# Patient Record
Sex: FEMALE | Race: WHITE | Employment: OTHER | URBAN - METROPOLITAN AREA
[De-identification: names, ages, dates, MRNs, and addresses within clinical notes are randomized per-mention and may not be internally consistent; named-entity substitution may affect disease eponyms.]

---

## 2018-05-01 ENCOUNTER — HOSPITAL ENCOUNTER (OUTPATIENT)
Dept: GENERAL RADIOLOGY | Age: 73
Discharge: HOME | End: 2018-05-03
Payer: COMMERCIAL

## 2018-05-01 DIAGNOSIS — Z12.39 SCREENING FOR MALIGNANT NEOPLASM OF BREAST: ICD-10-CM

## 2018-05-01 PROCEDURE — 77063 BREAST TOMOSYNTHESIS BI: CPT

## 2018-05-21 ENCOUNTER — HOSPITAL ENCOUNTER (OUTPATIENT)
Dept: SPEECH THERAPY | Age: 73
Setting detail: THERAPIES SERIES
Discharge: HOME | End: 2018-05-21
Payer: COMMERCIAL

## 2018-05-29 ENCOUNTER — HOSPITAL ENCOUNTER (OUTPATIENT)
Dept: SPEECH THERAPY | Age: 73
Setting detail: THERAPIES SERIES
Discharge: HOME | End: 2018-05-29
Payer: COMMERCIAL

## 2018-05-29 PROCEDURE — G9168 MEMORY CURRENT STATUS: HCPCS

## 2018-05-29 PROCEDURE — 96111 HC DEVELOP TESTING EXTD OT: CPT

## 2018-06-11 ENCOUNTER — OFFICE VISIT (OUTPATIENT)
Dept: ENT CLINIC | Age: 73
End: 2018-06-11
Payer: COMMERCIAL

## 2018-06-11 VITALS
HEART RATE: 72 BPM | HEIGHT: 64 IN | WEIGHT: 155 LBS | DIASTOLIC BLOOD PRESSURE: 67 MMHG | OXYGEN SATURATION: 93 % | SYSTOLIC BLOOD PRESSURE: 117 MMHG | BODY MASS INDEX: 26.46 KG/M2

## 2018-06-11 DIAGNOSIS — R42 VERTIGO: Primary | ICD-10-CM

## 2018-06-11 DIAGNOSIS — H81.11 BPPV (BENIGN PAROXYSMAL POSITIONAL VERTIGO), RIGHT: ICD-10-CM

## 2018-06-11 PROCEDURE — 99213 OFFICE O/P EST LOW 20 MIN: CPT | Performed by: OTOLARYNGOLOGY

## 2018-06-11 ASSESSMENT — ENCOUNTER SYMPTOMS
NAUSEA: 0
GASTROINTESTINAL NEGATIVE: 1
EYES NEGATIVE: 1
VOMITING: 0

## 2018-07-13 ENCOUNTER — HOSPITAL ENCOUNTER (EMERGENCY)
Age: 73
Discharge: HOME | End: 2018-07-13
Payer: COMMERCIAL

## 2018-07-13 ENCOUNTER — APPOINTMENT (OUTPATIENT)
Dept: CT IMAGING | Age: 73
End: 2018-07-13
Payer: COMMERCIAL

## 2018-07-13 VITALS
HEIGHT: 64 IN | WEIGHT: 154 LBS | DIASTOLIC BLOOD PRESSURE: 56 MMHG | TEMPERATURE: 98.5 F | OXYGEN SATURATION: 95 % | SYSTOLIC BLOOD PRESSURE: 114 MMHG | HEART RATE: 84 BPM | RESPIRATION RATE: 14 BRPM | BODY MASS INDEX: 26.29 KG/M2

## 2018-07-13 DIAGNOSIS — K57.32 DIVERTICULITIS OF COLON: ICD-10-CM

## 2018-07-13 DIAGNOSIS — R11.0 NAUSEA: ICD-10-CM

## 2018-07-13 DIAGNOSIS — R10.30 LOWER ABDOMINAL PAIN: Primary | ICD-10-CM

## 2018-07-13 LAB
ALBUMIN SERPL-MCNC: 4.5 G/DL (ref 3.5–5.2)
ALP BLD-CCNC: 91 U/L (ref 35–104)
ALT SERPL-CCNC: 30 U/L (ref 0–32)
ANION GAP SERPL CALCULATED.3IONS-SCNC: 14 MMOL/L (ref 7–16)
AST SERPL-CCNC: 23 U/L (ref 0–31)
BACTERIA: ABNORMAL /HPF
BASOPHILS ABSOLUTE: 0.02 E9/L (ref 0–0.2)
BASOPHILS RELATIVE PERCENT: 0.2 % (ref 0–2)
BILIRUB SERPL-MCNC: 0.7 MG/DL (ref 0–1.2)
BILIRUBIN URINE: NEGATIVE
BLOOD, URINE: ABNORMAL
BUN BLDV-MCNC: 15 MG/DL (ref 8–23)
CALCIUM SERPL-MCNC: 9.7 MG/DL (ref 8.6–10.2)
CHLORIDE BLD-SCNC: 100 MMOL/L (ref 98–107)
CLARITY: CLEAR
CO2: 25 MMOL/L (ref 22–29)
COLOR: YELLOW
CREAT SERPL-MCNC: 0.8 MG/DL (ref 0.5–1)
EOSINOPHILS ABSOLUTE: 0.12 E9/L (ref 0.05–0.5)
EOSINOPHILS RELATIVE PERCENT: 1.4 % (ref 0–6)
EPITHELIAL CELLS, UA: ABNORMAL /HPF
GFR AFRICAN AMERICAN: >60
GFR NON-AFRICAN AMERICAN: >60 ML/MIN/1.73
GLUCOSE BLD-MCNC: 89 MG/DL (ref 74–109)
GLUCOSE URINE: NEGATIVE MG/DL
HCT VFR BLD CALC: 37.8 % (ref 34–48)
HEMOGLOBIN: 13 G/DL (ref 11.5–15.5)
IMMATURE GRANULOCYTES #: 0.03 E9/L
IMMATURE GRANULOCYTES %: 0.3 % (ref 0–5)
KETONES, URINE: NEGATIVE MG/DL
LEUKOCYTE ESTERASE, URINE: NEGATIVE
LIPASE: 28 U/L (ref 13–60)
LYMPHOCYTES ABSOLUTE: 2.02 E9/L (ref 1.5–4)
LYMPHOCYTES RELATIVE PERCENT: 23.4 % (ref 20–42)
MCH RBC QN AUTO: 31.3 PG (ref 26–35)
MCHC RBC AUTO-ENTMCNC: 34.4 % (ref 32–34.5)
MCV RBC AUTO: 91.1 FL (ref 80–99.9)
MONOCYTES ABSOLUTE: 1.09 E9/L (ref 0.1–0.95)
MONOCYTES RELATIVE PERCENT: 12.6 % (ref 2–12)
NEUTROPHILS ABSOLUTE: 5.35 E9/L (ref 1.8–7.3)
NEUTROPHILS RELATIVE PERCENT: 62.1 % (ref 43–80)
NITRITE, URINE: NEGATIVE
PDW BLD-RTO: 13.1 FL (ref 11.5–15)
PH UA: 5.5 (ref 5–9)
PLATELET # BLD: 202 E9/L (ref 130–450)
PMV BLD AUTO: 11 FL (ref 7–12)
POTASSIUM SERPL-SCNC: 4.2 MMOL/L (ref 3.5–5)
PROTEIN UA: NEGATIVE MG/DL
RBC # BLD: 4.15 E12/L (ref 3.5–5.5)
RBC UA: ABNORMAL /HPF (ref 0–2)
RENAL EPITHELIAL, UA: ABNORMAL /HPF
SODIUM BLD-SCNC: 139 MMOL/L (ref 132–146)
SPECIFIC GRAVITY UA: <=1.005 (ref 1–1.03)
TOTAL PROTEIN: 7.6 G/DL (ref 6.4–8.3)
UROBILINOGEN, URINE: 0.2 E.U./DL
WBC # BLD: 8.6 E9/L (ref 4.5–11.5)
WBC UA: ABNORMAL /HPF (ref 0–5)

## 2018-07-13 PROCEDURE — 99284 EMERGENCY DEPT VISIT MOD MDM: CPT

## 2018-07-13 PROCEDURE — 74177 CT ABD & PELVIS W/CONTRAST: CPT

## 2018-07-13 PROCEDURE — 80053 COMPREHEN METABOLIC PANEL: CPT

## 2018-07-13 PROCEDURE — 2580000003 HC RX 258: Performed by: NURSE PRACTITIONER

## 2018-07-13 PROCEDURE — 6360000004 HC RX CONTRAST MEDICATION: Performed by: RADIOLOGY

## 2018-07-13 PROCEDURE — 36415 COLL VENOUS BLD VENIPUNCTURE: CPT

## 2018-07-13 PROCEDURE — 83690 ASSAY OF LIPASE: CPT

## 2018-07-13 PROCEDURE — 96375 TX/PRO/DX INJ NEW DRUG ADDON: CPT

## 2018-07-13 PROCEDURE — 96374 THER/PROPH/DIAG INJ IV PUSH: CPT

## 2018-07-13 PROCEDURE — 85025 COMPLETE CBC W/AUTO DIFF WBC: CPT

## 2018-07-13 PROCEDURE — 81001 URINALYSIS AUTO W/SCOPE: CPT

## 2018-07-13 PROCEDURE — 6360000002 HC RX W HCPCS: Performed by: NURSE PRACTITIONER

## 2018-07-13 RX ORDER — MORPHINE SULFATE 4 MG/ML
4 INJECTION, SOLUTION INTRAMUSCULAR; INTRAVENOUS ONCE
Status: COMPLETED | OUTPATIENT
Start: 2018-07-13 | End: 2018-07-13

## 2018-07-13 RX ORDER — CEFDINIR 300 MG/1
300 CAPSULE ORAL 2 TIMES DAILY
Qty: 20 CAPSULE | Refills: 0 | Status: SHIPPED | OUTPATIENT
Start: 2018-07-13 | End: 2018-07-23

## 2018-07-13 RX ORDER — ONDANSETRON 4 MG/1
4 TABLET, FILM COATED ORAL EVERY 8 HOURS PRN
Qty: 12 TABLET | Refills: 0 | Status: SHIPPED | OUTPATIENT
Start: 2018-07-13 | End: 2018-07-18

## 2018-07-13 RX ORDER — 0.9 % SODIUM CHLORIDE 0.9 %
1000 INTRAVENOUS SOLUTION INTRAVENOUS ONCE
Status: COMPLETED | OUTPATIENT
Start: 2018-07-13 | End: 2018-07-13

## 2018-07-13 RX ORDER — DICYCLOMINE HYDROCHLORIDE 10 MG/1
20 CAPSULE ORAL
Qty: 40 CAPSULE | Refills: 0 | Status: SHIPPED | OUTPATIENT
Start: 2018-07-13 | End: 2018-08-07 | Stop reason: SDUPTHER

## 2018-07-13 RX ORDER — METRONIDAZOLE 500 MG/1
500 TABLET ORAL 4 TIMES DAILY
Qty: 40 TABLET | Refills: 0 | Status: SHIPPED | OUTPATIENT
Start: 2018-07-13 | End: 2018-07-23

## 2018-07-13 RX ORDER — ONDANSETRON 2 MG/ML
4 INJECTION INTRAMUSCULAR; INTRAVENOUS ONCE
Status: COMPLETED | OUTPATIENT
Start: 2018-07-13 | End: 2018-07-13

## 2018-07-13 RX ADMIN — MORPHINE SULFATE 4 MG: 4 INJECTION INTRAVENOUS at 16:20

## 2018-07-13 RX ADMIN — IOPAMIDOL 100 ML: 755 INJECTION, SOLUTION INTRAVENOUS at 17:13

## 2018-07-13 RX ADMIN — SODIUM CHLORIDE 1000 ML: 9 INJECTION, SOLUTION INTRAVENOUS at 16:20

## 2018-07-13 RX ADMIN — ONDANSETRON 4 MG: 2 INJECTION INTRAMUSCULAR; INTRAVENOUS at 16:20

## 2018-07-13 ASSESSMENT — PAIN DESCRIPTION - LOCATION
LOCATION: ABDOMEN
LOCATION: ABDOMEN

## 2018-07-13 ASSESSMENT — PAIN DESCRIPTION - FREQUENCY: FREQUENCY: CONTINUOUS

## 2018-07-13 ASSESSMENT — PAIN DESCRIPTION - PROGRESSION: CLINICAL_PROGRESSION: NOT CHANGED

## 2018-07-13 ASSESSMENT — PAIN SCALES - GENERAL
PAINLEVEL_OUTOF10: 6

## 2018-07-13 ASSESSMENT — PAIN DESCRIPTION - DESCRIPTORS: DESCRIPTORS: SHARP

## 2018-07-13 ASSESSMENT — PAIN DESCRIPTION - PAIN TYPE
TYPE: ACUTE PAIN
TYPE: ACUTE PAIN

## 2018-07-13 NOTE — ED PROVIDER NOTES
[facility]     Department of Emergency Medicine   ED  Provider Note  Admit Date/Time: 7/13/2018  3:30 PM  ED Bed: 03/03  Chief Complaint:       Abdominal Pain (low abd pain x 3-4 days  + nausea)    History of Present Illness   Source of history provided by:  patient. History/Exam Limitations: none. Noah Gonzales is a 71 y.o. old female who has a past medical history of:   Past Medical History:   Diagnosis Date    Chronic headaches     Depression     Fatigue     Fibromyalgia     GERD (gastroesophageal reflux disease)     Hyperlipidemia     Nausea & vomiting     Von Willebrand disease ([address]     presents to the emergency department by private vehicle, for complaints of gradual onset, still present aching, pressure, sharp, shooting, stabbing pain in the lower abdomen without radiation which began 3 day(s) prior to arrival.   There has been no similar episodes in the past.  Since onset the symptoms have been persistent. The pain is associated with abdominal pain, vomiting and nausea. The pain is aggravated by none and relieved by none and nothing. There has been NO none. .  ROS   Pertinent positives and negatives are stated within HPI, all other systems reviewed and are negative. Past Surgical History:   Procedure Laterality Date    APPENDECTOMY      BACK SURGERY  2002    lumbar    CHOLECYSTECTOMY      COSMETIC SURGERY  2007    abdominoplasty    COSMETIC SURGERY  2009    facial lift    CYSTOSCOPY  3/8/16    HYSTERECTOMY      TONSILLECTOMY     Social History:  reports that she has never smoked. She has never used smokeless tobacco. She reports that she drinks alcohol. She reports that she does not use drugs. Family History: family history includes COPD in her mother; Cancer in her brother; Diabetes in her mother; High Blood Pressure in her father; Liver Disease in her mother; Lung Cancer in her brother and father. Allergies: Patient has no known allergies.     Physical Exam 4 times daily (before meals and nightly), Disp-40 capsule, R-nonePrint       !! - Potential duplicate medications found. Please discuss with provider. Electronically signed by WEST Muniz CNP   DD: 7/13/18  **This report was transcribed using voice recognition software. Every effort was made to ensure accuracy; however, inadvertent computerized transcription errors may be present.   END OF PROVIDER NOTE      WEST Muniz CNP  07/13/18 [id number]

## 2018-08-03 ENCOUNTER — TELEPHONE (OUTPATIENT)
Dept: ENT CLINIC | Age: 73
End: 2018-08-03

## 2018-08-07 PROBLEM — F32.0 CURRENT MILD EPISODE OF MAJOR DEPRESSIVE DISORDER WITHOUT PRIOR EPISODE (HCC): Status: ACTIVE | Noted: 2018-08-07

## 2018-08-07 PROBLEM — K58.2 IRRITABLE BOWEL SYNDROME WITH BOTH CONSTIPATION AND DIARRHEA: Status: ACTIVE | Noted: 2018-08-07

## 2018-09-12 ENCOUNTER — HOSPITAL ENCOUNTER (OUTPATIENT)
Dept: GENERAL RADIOLOGY | Age: 73
Discharge: HOME | End: 2018-09-14
Payer: COMMERCIAL

## 2018-09-12 ENCOUNTER — HOSPITAL ENCOUNTER (OUTPATIENT)
Dept: CT IMAGING | Age: 73
Discharge: HOME | End: 2018-09-14
Payer: COMMERCIAL

## 2018-09-12 DIAGNOSIS — T17.908A ASPIRATION INTO AIRWAY, INITIAL ENCOUNTER: ICD-10-CM

## 2018-09-12 PROCEDURE — G8998 SWALLOW D/C STATUS: HCPCS

## 2018-09-12 PROCEDURE — G8996 SWALLOW CURRENT STATUS: HCPCS

## 2018-09-12 PROCEDURE — 74230 X-RAY XM SWLNG FUNCJ C+: CPT

## 2018-09-12 PROCEDURE — G8997 SWALLOW GOAL STATUS: HCPCS

## 2018-09-12 PROCEDURE — 71250 CT THORAX DX C-: CPT

## 2018-09-12 PROCEDURE — 92611 MOTION FLUOROSCOPY/SWALLOW: CPT

## 2018-09-12 NOTE — PROGRESS NOTES
SPEECH/LANGUAGE PATHOLOGY  VIDEOFLUOROSCOPIC STUDY OF SWALLOWING ([facility] [place])      PATIENT NAME:  Clara Roper      :  1945      TODAY'S DATE:  2018    SUMMARY OF EVALUATION     DYSPHAGIA DIAGNOSIS:  Within Functional Limits     DIET RECOMMENDATIONS: Regular consistency solids with regular consistency liquids     COMPENSATORY STRATEGIES:      []Double swallow with []all consistencies []thin []nectar []honey []pureed []ground []chopped []soft solid []solid       []Multiple swallow (  Times) with []all consistencies []thin []nectar []honey []pureed []ground []chopped []soft solid []solid     []Chin tuck with []all consistencies  []thin []nectar []honey []pureed []ground []chopped []soft solid []solid      []Throat clear after with []all consistencies []thin []nectar []honey []pureed []ground []chopped []soft solid []solid      []Effortful swallow with []all consistencies  []thin []nectar []honey []pureed []ground []chopped []soft solid []solid     []Small bites/sips        []Alternate solids / liquids      []Check for oral pocketing     []No straw            []Spoon sip liquids        []       ASSISTANCE LEVEL:  [x]No assistance required   []Stand by assist   []Full assistance required  []Set up required   []Supervision with all PO intake    [] Malnutrition indicators have been identified and nursing has been notified to ensure a dietary consult is ordered.      THERAPY RECOMMENDATIONS:       [x]  Therapy is not recommended       []  Therapy is recommended to:     []  Improve oral motor strength and range of motion     []  Improve tongue base retraction      []  Improve laryngeal strength and range of motion     []  Address cricopharyngeal dysfunction (Shaker Exercises)    []  Mealtime assessment of patient's tolerance of prescribed diet     []  Repeat Video Swallowing Evaluation is recommended and requires a Physician order    []Therapy at the discretion of facility/treating Speech Pathologist ([]inconsistently  []consistently []only with use of a straw). [] Laryngeal penetration was noted AFTER the swallow for ([]thin []nectar []honey[] pureed []solid)          due to: []residuals in laryngeal vestibule       []pharyngeal residual       []redirection of bolus from the esophagus        which  []cleared from the laryngeal vestibule spontaneously  (transient)     []cleared from the laryngeal vestibule with a cued, re-directive throat clear       []remained in the laryngeal vestibule. []penetrated deep into the laryngeal vestibule (to the level of the true vocal folds)      Laryngeal penetration was  ([]trace []mild []moderate []marked []severe ) and      occurred ([]inconsistently  []consistently []only with use of a straw).         In response to laryngeal penetration,  []A  spontaneous cough/throat clear [] an inconsistent  [] a delayed cough       []an absent cough/throat clear was noted     ASPIRATION    [x]Aspiration was not present during this evaluation      []Aspiration BEFORE the swallow was present for ([]thin[] nectar[] honey []pureed []solid)       due to: ([] decreased bolus formation []premature pharyngeal entry []delayed pharyngeal swallow)       []Aspiration DURING the swallow was present for  ([]thin []nectar []honey[] pureed []solid)      due to: ([]delayed laryngeal closure []inadequate laryngeal closure)       []Aspiration AFTER  the swallow was present for ([]thin[] nectar []honey []pureed []solid)      due to:  ([]residuals in laryngeal vestibule []pharyngeal residual []redirection of bolus from the esophagus)      In response to aspiration,  []A  spontaneous cough/throat clear [] an inconsistent/delayed cough      []an absent cough/throat clear was noted     COMPENSATORY STRATEGIES    [] Compensatory strategies that were beneficial included: []chin tuck []double swallow []multiple swallow []alternating solids/liquids          []cued redirective cough []cued throat clear [] Compensatory strategies that were not beneficial included: []chin tuck []double swallow []multiple swallow []alternating solids/liquids          []cued redirective cough []cued throat clear       [x] Compensatory strategies were not attempted. STRUCTURAL/FUNCTIONAL ANOMALIES    [x]No structural/functional anomalies were noted      []Inadequate velopharyngeal closure resulting in nasopharyngeal reflux. [] There was presence of Zenkers Diverticulum per Radiologist        []Comments:       CERVICAL ESOPHAGEAL STAGE : [x]Adequate []Inadequate  []Not Assessed     []Cervical osteophytes present per Radiologist   []Structural/mechanical abnormality in cervical esophagus per Radiologist  [] Redirection of bolus from the esophagus into pharynx                                []  Prognosis for improvements is   []  This plan will be re-evaluated and revised in 1 week  if warranted. []  Patient stated goals:   []  Treatment goals discussed with [] patient/  [] family. []  The []  patient/ []  family understand the diagnosis, prognosis and plan of care. [x]The admitting diagnosis and active problem list, as listed below have been reviewed prior to initiation of this evaluation.      ADMITTING DIAGNOSIS: Aspiration into airway, initial encounter [T17.511P]     ACTIVE PROBLEM LIST:   Patient Active Problem List   Diagnosis    Allergic reaction    Syncope    Pain    Other and unspecified hyperlipidemia    Anxiety    Depression    Osteoarthritis    Pure hypercholesterolemia    Fibromyalgia    Lower abdominal pain    Diverticulitis of colon    Nausea    Irritable bowel syndrome with both constipation and diarrhea    Current mild episode of major depressive disorder without prior episode ([facility] [address]

## 2018-10-03 ENCOUNTER — HOSPITAL ENCOUNTER (OUTPATIENT)
Age: 73
Discharge: HOME | End: 2018-10-05

## 2018-10-03 PROCEDURE — 88342 IMHCHEM/IMCYTCHM 1ST ANTB: CPT

## 2018-10-03 PROCEDURE — 88305 TISSUE EXAM BY PATHOLOGIST: CPT

## 2019-08-23 ENCOUNTER — TELEPHONE (OUTPATIENT)
Dept: CARDIOLOGY | Age: 74
End: 2019-08-23

## 2019-08-27 ENCOUNTER — HOSPITAL ENCOUNTER (OUTPATIENT)
Dept: CARDIOLOGY | Age: 74
Discharge: HOME | End: 2019-08-27
Payer: COMMERCIAL

## 2019-08-27 VITALS
HEART RATE: 75 BPM | WEIGHT: 150 LBS | SYSTOLIC BLOOD PRESSURE: 138 MMHG | HEIGHT: 63 IN | BODY MASS INDEX: 26.58 KG/M2 | OXYGEN SATURATION: 96 % | DIASTOLIC BLOOD PRESSURE: 80 MMHG

## 2019-08-27 DIAGNOSIS — R06.02 SOB (SHORTNESS OF BREATH): ICD-10-CM

## 2019-08-27 DIAGNOSIS — R07.9 CHEST PAIN, UNSPECIFIED TYPE: ICD-10-CM

## 2019-08-27 LAB
LV EF: 71 %
LVEF MODALITY: NORMAL

## 2019-08-27 PROCEDURE — 3430000000 HC RX DIAGNOSTIC RADIOPHARMACEUTICAL: Performed by: INTERNAL MEDICINE

## 2019-08-27 PROCEDURE — 93017 CV STRESS TEST TRACING ONLY: CPT

## 2019-08-27 PROCEDURE — 2580000003 HC RX 258: Performed by: INTERNAL MEDICINE

## 2019-08-27 PROCEDURE — A9502 TC99M TETROFOSMIN: HCPCS | Performed by: INTERNAL MEDICINE

## 2019-08-27 PROCEDURE — 78452 HT MUSCLE IMAGE SPECT MULT: CPT

## 2019-08-27 RX ORDER — LORAZEPAM 0.5 MG/1
TABLET ORAL
Refills: 0 | COMMUNITY
Start: 2019-08-22 | End: 2020-01-14 | Stop reason: SDUPTHER

## 2019-08-27 RX ORDER — SODIUM CHLORIDE 0.9 % (FLUSH) 0.9 %
10 SYRINGE (ML) INJECTION PRN
Status: DISCONTINUED | OUTPATIENT
Start: 2019-08-27 | End: 2019-08-28 | Stop reason: HOSPADM

## 2019-08-27 RX ADMIN — Medication 10 ML: at 08:37

## 2019-08-27 RX ADMIN — Medication 10 ML: at 09:37

## 2019-08-27 RX ADMIN — TETROFOSMIN 9.2 MILLICURIE: 0.23 INJECTION, POWDER, LYOPHILIZED, FOR SOLUTION INTRAVENOUS at 08:37

## 2019-08-27 RX ADMIN — TETROFOSMIN 25.2 MILLICURIE: 0.23 INJECTION, POWDER, LYOPHILIZED, FOR SOLUTION INTRAVENOUS at 09:37

## 2019-10-30 ENCOUNTER — HOSPITAL ENCOUNTER (OUTPATIENT)
Dept: CT IMAGING | Age: 74
Discharge: HOME | End: 2019-11-01
Payer: COMMERCIAL

## 2019-10-30 PROCEDURE — 71250 CT THORAX DX C-: CPT

## 2020-02-05 ENCOUNTER — HOSPITAL ENCOUNTER (OUTPATIENT)
Age: 75
Discharge: HOME | End: 2020-02-07
Payer: MEDICARE

## 2020-02-05 ENCOUNTER — HOSPITAL ENCOUNTER (OUTPATIENT)
Dept: GENERAL RADIOLOGY | Age: 75
Discharge: HOME | End: 2020-02-07
Payer: MEDICARE

## 2020-02-05 PROCEDURE — 72050 X-RAY EXAM NECK SPINE 4/5VWS: CPT

## 2021-02-12 ENCOUNTER — HOSPITAL ENCOUNTER (OUTPATIENT)
Age: 76
Discharge: HOME | End: 2021-02-14

## 2021-02-12 LAB
ABO/RH: NORMAL
ANTIBODY SCREEN: NORMAL

## 2021-02-12 PROCEDURE — 87081 CULTURE SCREEN ONLY: CPT

## 2021-02-12 PROCEDURE — 86850 RBC ANTIBODY SCREEN: CPT

## 2021-02-12 PROCEDURE — 86901 BLOOD TYPING SEROLOGIC RH(D): CPT

## 2021-02-12 PROCEDURE — 86900 BLOOD TYPING SEROLOGIC ABO: CPT

## 2021-02-14 LAB — MRSA CULTURE ONLY: NORMAL

## 2021-02-19 ENCOUNTER — HOSPITAL ENCOUNTER (OUTPATIENT)
Age: 76
Discharge: HOME | End: 2021-02-21

## 2021-02-19 LAB
ANION GAP SERPL CALCULATED.3IONS-SCNC: 9 MMOL/L (ref 7–16)
BUN BLDV-MCNC: 11 MG/DL (ref 8–23)
CALCIUM SERPL-MCNC: 8.5 MG/DL (ref 8.6–10.2)
CHLORIDE BLD-SCNC: 98 MMOL/L (ref 98–107)
CO2: 26 MMOL/L (ref 22–29)
CREAT SERPL-MCNC: 0.6 MG/DL (ref 0.5–1)
GFR AFRICAN AMERICAN: >60
GFR NON-AFRICAN AMERICAN: >60 ML/MIN/1.73
GLUCOSE BLD-MCNC: 139 MG/DL (ref 74–99)
HCT VFR BLD CALC: 34.2 % (ref 34–48)
HEMOGLOBIN: 11.2 G/DL (ref 11.5–15.5)
MCH RBC QN AUTO: 31.5 PG (ref 26–35)
MCHC RBC AUTO-ENTMCNC: 32.7 % (ref 32–34.5)
MCV RBC AUTO: 96.1 FL (ref 80–99.9)
PDW BLD-RTO: 13.8 FL (ref 11.5–15)
PLATELET # BLD: 175 E9/L (ref 130–450)
PMV BLD AUTO: 11.7 FL (ref 7–12)
POTASSIUM SERPL-SCNC: 4 MMOL/L (ref 3.5–5)
RBC # BLD: 3.56 E12/L (ref 3.5–5.5)
SODIUM BLD-SCNC: 133 MMOL/L (ref 132–146)
WBC # BLD: 8.8 E9/L (ref 4.5–11.5)

## 2021-02-19 PROCEDURE — 80048 BASIC METABOLIC PNL TOTAL CA: CPT

## 2021-02-19 PROCEDURE — 85027 COMPLETE CBC AUTOMATED: CPT

## 2021-05-07 ENCOUNTER — APPOINTMENT (OUTPATIENT)
Dept: GENERAL RADIOLOGY | Age: 76
End: 2021-05-07
Payer: MEDICARE

## 2021-05-07 ENCOUNTER — APPOINTMENT (OUTPATIENT)
Dept: CT IMAGING | Age: 76
End: 2021-05-07
Payer: MEDICARE

## 2021-05-07 ENCOUNTER — HOSPITAL ENCOUNTER (OUTPATIENT)
Age: 76
Setting detail: OBSERVATION
Discharge: HOME | End: 2021-05-08
Attending: EMERGENCY MEDICINE | Admitting: INTERNAL MEDICINE
Payer: MEDICARE

## 2021-05-07 DIAGNOSIS — D68.00 VON WILLEBRAND'S DISEASE: ICD-10-CM

## 2021-05-07 DIAGNOSIS — R10.13 ABDOMINAL PAIN, EPIGASTRIC: ICD-10-CM

## 2021-05-07 DIAGNOSIS — K92.1 MELENA: Primary | ICD-10-CM

## 2021-05-07 LAB
ALBUMIN SERPL-MCNC: 4.5 G/DL (ref 3.5–5.2)
ALP BLD-CCNC: 79 U/L (ref 35–104)
ALT SERPL-CCNC: 18 U/L (ref 0–32)
ANION GAP SERPL CALCULATED.3IONS-SCNC: 9 MMOL/L (ref 7–16)
APTT: 33.2 SEC (ref 24.5–35.1)
AST SERPL-CCNC: 19 U/L (ref 0–31)
BASOPHILS ABSOLUTE: 0.03 E9/L (ref 0–0.2)
BASOPHILS RELATIVE PERCENT: 0.5 % (ref 0–2)
BILIRUB SERPL-MCNC: 0.5 MG/DL (ref 0–1.2)
BILIRUBIN URINE: NEGATIVE
BLOOD, URINE: NEGATIVE
BUN BLDV-MCNC: 14 MG/DL (ref 6–23)
CALCIUM SERPL-MCNC: 9.1 MG/DL (ref 8.6–10.2)
CHLORIDE BLD-SCNC: 101 MMOL/L (ref 98–107)
CLARITY: CLEAR
CO2: 27 MMOL/L (ref 22–29)
COLOR: YELLOW
CREAT SERPL-MCNC: 0.9 MG/DL (ref 0.5–1)
EOSINOPHILS ABSOLUTE: 0.2 E9/L (ref 0.05–0.5)
EOSINOPHILS RELATIVE PERCENT: 3.1 % (ref 0–6)
GFR AFRICAN AMERICAN: >60
GFR NON-AFRICAN AMERICAN: >60 ML/MIN/1.73
GLUCOSE BLD-MCNC: 92 MG/DL (ref 74–99)
GLUCOSE URINE: NEGATIVE MG/DL
HCT VFR BLD CALC: 40.2 % (ref 34–48)
HEMOGLOBIN: 13.1 G/DL (ref 11.5–15.5)
IMMATURE GRANULOCYTES #: 0.01 E9/L
IMMATURE GRANULOCYTES %: 0.2 % (ref 0–5)
INR BLD: 1
KETONES, URINE: NEGATIVE MG/DL
LACTIC ACID: 0.7 MMOL/L (ref 0.5–2.2)
LEUKOCYTE ESTERASE, URINE: NEGATIVE
LYMPHOCYTES ABSOLUTE: 1.69 E9/L (ref 1.5–4)
LYMPHOCYTES RELATIVE PERCENT: 25.9 % (ref 20–42)
MAGNESIUM: 2.1 MG/DL (ref 1.6–2.6)
MCH RBC QN AUTO: 30.5 PG (ref 26–35)
MCHC RBC AUTO-ENTMCNC: 32.6 % (ref 32–34.5)
MCV RBC AUTO: 93.7 FL (ref 80–99.9)
MONOCYTES ABSOLUTE: 0.66 E9/L (ref 0.1–0.95)
MONOCYTES RELATIVE PERCENT: 10.1 % (ref 2–12)
NEUTROPHILS ABSOLUTE: 3.94 E9/L (ref 1.8–7.3)
NEUTROPHILS RELATIVE PERCENT: 60.2 % (ref 43–80)
NITRITE, URINE: NEGATIVE
PDW BLD-RTO: 13.6 FL (ref 11.5–15)
PH UA: 6 (ref 5–9)
PLATELET # BLD: 213 E9/L (ref 130–450)
PMV BLD AUTO: 11.6 FL (ref 7–12)
POTASSIUM SERPL-SCNC: 4 MMOL/L (ref 3.5–5)
PROTEIN UA: NEGATIVE MG/DL
PROTHROMBIN TIME: 11.2 SEC (ref 9.3–12.4)
RBC # BLD: 4.29 E12/L (ref 3.5–5.5)
SODIUM BLD-SCNC: 137 MMOL/L (ref 132–146)
SPECIFIC GRAVITY UA: 1.02 (ref 1–1.03)
TOTAL PROTEIN: 7 G/DL (ref 6.4–8.3)
TROPONIN: <0.01 NG/ML (ref 0–0.03)
UROBILINOGEN, URINE: 0.2 E.U./DL
WBC # BLD: 6.5 E9/L (ref 4.5–11.5)

## 2021-05-07 PROCEDURE — 6360000002 HC RX W HCPCS: Performed by: EMERGENCY MEDICINE

## 2021-05-07 PROCEDURE — 96367 TX/PROPH/DG ADDL SEQ IV INF: CPT

## 2021-05-07 PROCEDURE — G0378 HOSPITAL OBSERVATION PER HR: HCPCS

## 2021-05-07 PROCEDURE — 85025 COMPLETE CBC W/AUTO DIFF WBC: CPT

## 2021-05-07 PROCEDURE — 6360000004 HC RX CONTRAST MEDICATION: Performed by: RADIOLOGY

## 2021-05-07 PROCEDURE — 84484 ASSAY OF TROPONIN QUANT: CPT

## 2021-05-07 PROCEDURE — 80053 COMPREHEN METABOLIC PANEL: CPT

## 2021-05-07 PROCEDURE — 85730 THROMBOPLASTIN TIME PARTIAL: CPT

## 2021-05-07 PROCEDURE — 96365 THER/PROPH/DIAG IV INF INIT: CPT

## 2021-05-07 PROCEDURE — 81003 URINALYSIS AUTO W/O SCOPE: CPT

## 2021-05-07 PROCEDURE — 2580000003 HC RX 258: Performed by: EMERGENCY MEDICINE

## 2021-05-07 PROCEDURE — 2500000003 HC RX 250 WO HCPCS: Performed by: EMERGENCY MEDICINE

## 2021-05-07 PROCEDURE — 71045 X-RAY EXAM CHEST 1 VIEW: CPT

## 2021-05-07 PROCEDURE — 99284 EMERGENCY DEPT VISIT MOD MDM: CPT

## 2021-05-07 PROCEDURE — 83735 ASSAY OF MAGNESIUM: CPT

## 2021-05-07 PROCEDURE — 83605 ASSAY OF LACTIC ACID: CPT

## 2021-05-07 PROCEDURE — C9113 INJ PANTOPRAZOLE SODIUM, VIA: HCPCS | Performed by: EMERGENCY MEDICINE

## 2021-05-07 PROCEDURE — 2580000003 HC RX 258: Performed by: INTERNAL MEDICINE

## 2021-05-07 PROCEDURE — 85610 PROTHROMBIN TIME: CPT

## 2021-05-07 PROCEDURE — 74177 CT ABD & PELVIS W/CONTRAST: CPT

## 2021-05-07 RX ORDER — FAMOTIDINE 20 MG/1
20 TABLET, FILM COATED ORAL 2 TIMES DAILY
Status: DISCONTINUED | OUTPATIENT
Start: 2021-05-07 | End: 2021-05-08 | Stop reason: HOSPADM

## 2021-05-07 RX ORDER — 0.9 % SODIUM CHLORIDE 0.9 %
500 INTRAVENOUS SOLUTION INTRAVENOUS ONCE
Status: COMPLETED | OUTPATIENT
Start: 2021-05-07 | End: 2021-05-07

## 2021-05-07 RX ORDER — POTASSIUM CHLORIDE 7.45 MG/ML
10 INJECTION INTRAVENOUS PRN
Status: DISCONTINUED | OUTPATIENT
Start: 2021-05-07 | End: 2021-05-08 | Stop reason: HOSPADM

## 2021-05-07 RX ORDER — ACETAMINOPHEN 650 MG/1
650 SUPPOSITORY RECTAL EVERY 6 HOURS PRN
Status: DISCONTINUED | OUTPATIENT
Start: 2021-05-07 | End: 2021-05-08 | Stop reason: HOSPADM

## 2021-05-07 RX ORDER — SODIUM CHLORIDE 9 MG/ML
25 INJECTION, SOLUTION INTRAVENOUS PRN
Status: DISCONTINUED | OUTPATIENT
Start: 2021-05-07 | End: 2021-05-08 | Stop reason: HOSPADM

## 2021-05-07 RX ORDER — SODIUM CHLORIDE 0.9 % (FLUSH) 0.9 %
10 SYRINGE (ML) INJECTION PRN
Status: DISCONTINUED | OUTPATIENT
Start: 2021-05-07 | End: 2021-05-08 | Stop reason: HOSPADM

## 2021-05-07 RX ORDER — BUDESONIDE AND FORMOTEROL FUMARATE DIHYDRATE 160; 4.5 UG/1; UG/1
1 AEROSOL RESPIRATORY (INHALATION) 2 TIMES DAILY
Status: DISCONTINUED | OUTPATIENT
Start: 2021-05-07 | End: 2021-05-07 | Stop reason: CLARIF

## 2021-05-07 RX ORDER — SENNA PLUS 8.6 MG/1
1 TABLET ORAL DAILY PRN
Status: DISCONTINUED | OUTPATIENT
Start: 2021-05-07 | End: 2021-05-08 | Stop reason: HOSPADM

## 2021-05-07 RX ORDER — ACETAMINOPHEN 325 MG/1
650 TABLET ORAL EVERY 6 HOURS PRN
Status: DISCONTINUED | OUTPATIENT
Start: 2021-05-07 | End: 2021-05-08 | Stop reason: HOSPADM

## 2021-05-07 RX ORDER — POTASSIUM CHLORIDE 20 MEQ/1
40 TABLET, EXTENDED RELEASE ORAL PRN
Status: DISCONTINUED | OUTPATIENT
Start: 2021-05-07 | End: 2021-05-08 | Stop reason: HOSPADM

## 2021-05-07 RX ORDER — SODIUM CHLORIDE 9 MG/ML
INJECTION, SOLUTION INTRAVENOUS CONTINUOUS
Status: DISCONTINUED | OUTPATIENT
Start: 2021-05-07 | End: 2021-05-08 | Stop reason: HOSPADM

## 2021-05-07 RX ORDER — TRAZODONE HYDROCHLORIDE 150 MG/1
150 TABLET ORAL NIGHTLY
Status: DISCONTINUED | OUTPATIENT
Start: 2021-05-07 | End: 2021-05-08 | Stop reason: HOSPADM

## 2021-05-07 RX ORDER — PAROXETINE HYDROCHLORIDE 20 MG/1
20 TABLET, FILM COATED ORAL EVERY MORNING
Status: DISCONTINUED | OUTPATIENT
Start: 2021-05-08 | End: 2021-05-08 | Stop reason: HOSPADM

## 2021-05-07 RX ORDER — SODIUM CHLORIDE 0.9 % (FLUSH) 0.9 %
10 SYRINGE (ML) INJECTION EVERY 12 HOURS SCHEDULED
Status: DISCONTINUED | OUTPATIENT
Start: 2021-05-07 | End: 2021-05-08 | Stop reason: HOSPADM

## 2021-05-07 RX ORDER — ONDANSETRON 2 MG/ML
4 INJECTION INTRAMUSCULAR; INTRAVENOUS EVERY 6 HOURS PRN
Status: DISCONTINUED | OUTPATIENT
Start: 2021-05-07 | End: 2021-05-08 | Stop reason: HOSPADM

## 2021-05-07 RX ORDER — BUPROPION HYDROCHLORIDE 300 MG/1
300 TABLET ORAL DAILY
Status: DISCONTINUED | OUTPATIENT
Start: 2021-05-08 | End: 2021-05-08 | Stop reason: HOSPADM

## 2021-05-07 RX ORDER — PROMETHAZINE HYDROCHLORIDE 25 MG/1
12.5 TABLET ORAL EVERY 6 HOURS PRN
Status: DISCONTINUED | OUTPATIENT
Start: 2021-05-07 | End: 2021-05-08 | Stop reason: HOSPADM

## 2021-05-07 RX ORDER — BUDESONIDE 0.5 MG/2ML
0.5 INHALANT ORAL 2 TIMES DAILY
Status: DISCONTINUED | OUTPATIENT
Start: 2021-05-07 | End: 2021-05-08 | Stop reason: HOSPADM

## 2021-05-07 RX ORDER — ONDANSETRON 4 MG/1
4 TABLET, FILM COATED ORAL DAILY PRN
Status: DISCONTINUED | OUTPATIENT
Start: 2021-05-07 | End: 2021-05-08 | Stop reason: HOSPADM

## 2021-05-07 RX ORDER — PANTOPRAZOLE SODIUM 40 MG/1
40 TABLET, DELAYED RELEASE ORAL
Status: DISCONTINUED | OUTPATIENT
Start: 2021-05-08 | End: 2021-05-08 | Stop reason: HOSPADM

## 2021-05-07 RX ORDER — ARFORMOTEROL TARTRATE 15 UG/2ML
15 SOLUTION RESPIRATORY (INHALATION) 2 TIMES DAILY
Status: DISCONTINUED | OUTPATIENT
Start: 2021-05-07 | End: 2021-05-08 | Stop reason: HOSPADM

## 2021-05-07 RX ADMIN — SODIUM CHLORIDE 500 ML: 9 INJECTION, SOLUTION INTRAVENOUS at 17:30

## 2021-05-07 RX ADMIN — SODIUM CHLORIDE, PRESERVATIVE FREE 10 ML: 5 INJECTION INTRAVENOUS at 23:25

## 2021-05-07 RX ADMIN — PANTOPRAZOLE SODIUM 80 MG: 40 INJECTION, POWDER, FOR SOLUTION INTRAVENOUS at 17:28

## 2021-05-07 RX ADMIN — IOPAMIDOL 75 ML: 755 INJECTION, SOLUTION INTRAVENOUS at 18:27

## 2021-05-07 RX ADMIN — TRANEXAMIC ACID 1000 MG: 1 INJECTION, SOLUTION INTRAVENOUS at 19:06

## 2021-05-07 RX ADMIN — SODIUM CHLORIDE: 9 INJECTION, SOLUTION INTRAVENOUS at 23:25

## 2021-05-07 NOTE — ED PROVIDER NOTES
Department of Emergency Medicine   ED  Provider Note  Admit Date/RoomTime: 5/7/2021  4:09 PM  ED Room: 04/04          History of Present Illness:  5/7/21, Time: 4:49 PM EDT  Chief Complaint   Patient presents with    Rectal Bleeding     onset last night, reporrtd 6 episodes with BM, denies blood thinners                Ashley Yu is a 77 y.o. female presenting to the ED for rectal bleeding, beginning several hours. The complaint has been persistent, moderate in severity, and worsened by nothing. Patient presents for rectal bleeding. She states last night she developed some epigastric abdominal discomfort and had to have 3 episodes of diarrhea. States on the third episode the light was on in the bathroom and she noticed she was having significant mount of bright red blood per rectum mixed into her stools as well as clots. She does have a history of von Willebrand's disease as well as previous ulcer. She still complains of epigastric abdominal pain. Denies nausea vomiting lightheadedness or chest pain. Has a hematologist but does not recall their name. States it has been many years since she had a scope from surgery. Reports she cooked at home last night, her  ate the same thing and is not experiencing any illness. Hx of VwF  Surgery - Bruce/Caroline       Review of Systems:   Pertinent positives and negatives are stated within HPI, all other systems reviewed and are negative.        --------------------------------------------- PAST HISTORY ---------------------------------------------  Past Medical History:  has a past medical history of Chronic headaches, Depression, Fatigue, Fibromyalgia, GERD (gastroesophageal reflux disease), Hyperlipidemia, Nausea & vomiting, and Von Willebrand disease ([facility] Past Surgical History:  has a past surgical history that includes Hysterectomy; Appendectomy; Cholecystectomy; Tonsillectomy; Cosmetic surgery (2007);  Cosmetic surgery (2009); back surgery LABS: (Lab results interpreted by me)  Results for orders placed or performed during the hospital encounter of 05/07/21   Troponin   Result Value Ref Range    Troponin <0.01 0.00 - 0.03 ng/mL   CBC Auto Differential   Result Value Ref Range    WBC 6.5 4.5 - 11.5 E9/L    RBC 4.29 3.50 - 5.50 E12/L    Hemoglobin 13.1 11.5 - 15.5 g/dL    Hematocrit 40.2 34.0 - 48.0 %    MCV 93.7 80.0 - 99.9 fL    MCH 30.5 26.0 - 35.0 pg    MCHC 32.6 32.0 - 34.5 %    RDW 13.6 11.5 - 15.0 fL    Platelets 754 284 - 821 E9/L    MPV 11.6 7.0 - 12.0 fL    Neutrophils % 60.2 43.0 - 80.0 %    Immature Granulocytes % 0.2 0.0 - 5.0 %    Lymphocytes % 25.9 20.0 - 42.0 %    Monocytes % 10.1 2.0 - 12.0 %    Eosinophils % 3.1 0.0 - 6.0 %    Basophils % 0.5 0.0 - 2.0 %    Neutrophils Absolute 3.94 1.80 - 7.30 E9/L    Immature Granulocytes # 0.01 E9/L    Lymphocytes Absolute 1.69 1.50 - 4.00 E9/L    Monocytes Absolute 0.66 0.10 - 0.95 E9/L    Eosinophils Absolute 0.20 0.05 - 0.50 E9/L    Basophils Absolute 0.03 0.00 - 0.20 E9/L   Comprehensive Metabolic Panel   Result Value Ref Range    Sodium 137 132 - 146 mmol/L    Potassium 4.0 3.5 - 5.0 mmol/L    Chloride 101 98 - 107 mmol/L    CO2 27 22 - 29 mmol/L    Anion Gap 9 7 - 16 mmol/L    Glucose 92 74 - 99 mg/dL    BUN 14 6 - 23 mg/dL    CREATININE 0.9 0.5 - 1.0 mg/dL    GFR Non-African American >60 >=60 mL/min/1.73    GFR African American >60     Calcium 9.1 8.6 - 10.2 mg/dL    Total Protein 7.0 6.4 - 8.3 g/dL    Albumin 4.5 3.5 - 5.2 g/dL    Total Bilirubin 0.5 0.0 - 1.2 mg/dL    Alkaline Phosphatase 79 35 - 104 U/L    ALT 18 0 - 32 U/L    AST 19 0 - 31 U/L   Protime-INR   Result Value Ref Range    Protime 11.2 9.3 - 12.4 sec    INR 1.0    APTT   Result Value Ref Range    aPTT 33.2 24.5 - 35.1 sec   Magnesium   Result Value Ref Range    Magnesium 2.1 1.6 - 2.6 mg/dL   Lactic Acid, Plasma   Result Value Ref Range    Lactic Acid 0.7 0.5 - 2.2 mmol/L   Urinalysis   Result Value Ref Range    Color, UA Yellow Straw/Yellow    Clarity, UA Clear Clear    Glucose, Ur Negative Negative mg/dL    Bilirubin Urine Negative Negative    Ketones, Urine Negative Negative mg/dL    Specific Gravity, UA 1.020 1.005 - 1.030    Blood, Urine Negative Negative    pH, UA 6.0 5.0 - 9.0    Protein, UA Negative Negative mg/dL    Urobilinogen, Urine 0.2 <2.0 E.U./dL    Nitrite, Urine Negative Negative    Leukocyte Esterase, Urine Negative Negative   ,       RADIOLOGY:  Interpreted by Radiologist unless otherwise specified  CT ABDOMEN PELVIS W IV CONTRAST Additional Contrast? None   Final Result   No definitive findings to explain the patient's symptoms. Cholecystectomy. Intra and extrahepatic biliary duct dilatation most likely   related to reservoir effect. Sigmoid diverticulosis. XR CHEST PORTABLE   Final Result   No acute process. ------------------------- NURSING NOTES AND VITALS REVIEWED ---------------------------   The nursing notes within the ED encounter and vital signs as below have been reviewed by myself  /69   Pulse 72   Temp 97.9 °F (36.6 °C) (Infrared)   Resp 16   Ht 5' 4\" (1.626 m)   Wt 147 lb (66.7 kg)   LMP  (LMP Unknown)   SpO2 96%   BMI 25.23 kg/m²     Oxygen Saturation Interpretation: Normal    The cardiac monitor revealed NSR with a heart rate in the 70s as interpreted by me. The cardiac monitor was ordered secondary to the patient's heart rate and to monitor the patient for dysrhythmia. CPT 22919    The patients available past medical records and past encounters were reviewed.         ------------------------------ ED COURSE/MEDICAL DECISION MAKING----------------------  Medications   0.9 % sodium chloride infusion (has no administration in time range)   0.9 % sodium chloride bolus (0 mLs Intravenous Stopped 5/7/21 1905)   pantoprazole (PROTONIX) 80 mg in sodium chloride 0.9 % 100 mL bolus (0 mg Intravenous Stopped 5/7/21 1800)   tranexamic acid (CYKLOKAPRON) 1,000 mg in dextrose 5 % 100 mL IVPB (1,000 mg Intravenous New Bag 5/7/21 1906)   iopamidol (ISOVUE-370) 76 % injection 75 mL (75 mLs Intravenous Given 5/7/21 1827)                    Medical Decision Making:     I, Dr. Darcy Cuevas am the primary provider of record    Work-up undertaken. H&H reassuring, states bleeding has improved. Given epigastric abdominal pain prior history and von Willebrand's disease spoke with medicine patient will be observed. She states she feels better after Protonix and transischemic acid. Re-Evaluations:        Re-evaluation. Patients symptoms are improving  Repeat physical examination is improved        This patient's ED course included: a personal history and physicial examination, re-evaluation prior to disposition, IV medications, cardiac monitoring, continuous pulse oximetry and complex medical decision making and emergency management    This patient has remained hemodynamically stable during their ED course. Consultations:  Spoke with Dr. Junior Pacheco for [facility] (Medicine). Discussed case. They will admit this patient. Critical Care:         Counseling: The emergency provider has spoken with the patient and spouse/SO and discussed todays results, in addition to providing specific details for the plan of care and counseling regarding the diagnosis and prognosis. Questions are answered at this time and they are agreeable with the plan.       --------------------------------- IMPRESSION AND DISPOSITION ---------------------------------    IMPRESSION  1. Melena    2. Abdominal pain, epigastric    3. [place] Joselin disease ([facility] [address]        DISPOSITION  Disposition: Admit to med/surg floor  Patient condition is stable        NOTE: This report was transcribed using voice recognition software.  Every effort was made to ensure accuracy; however, inadvertent computerized transcription errors may be present       Tyrone Melo DO  05/07/21 [id number]

## 2021-05-08 VITALS
TEMPERATURE: 97.9 F | DIASTOLIC BLOOD PRESSURE: 59 MMHG | HEART RATE: 78 BPM | RESPIRATION RATE: 14 BRPM | SYSTOLIC BLOOD PRESSURE: 124 MMHG | WEIGHT: 145.8 LBS | HEIGHT: 64 IN | OXYGEN SATURATION: 96 % | BODY MASS INDEX: 24.89 KG/M2

## 2021-05-08 PROBLEM — K92.1 MELENA: Status: RESOLVED | Noted: 2021-05-07 | Resolved: 2021-05-08

## 2021-05-08 LAB
ALBUMIN SERPL-MCNC: 3.6 G/DL (ref 3.5–5.2)
ALP BLD-CCNC: 65 U/L (ref 35–104)
ALT SERPL-CCNC: 14 U/L (ref 0–32)
ANION GAP SERPL CALCULATED.3IONS-SCNC: 5 MMOL/L (ref 7–16)
AST SERPL-CCNC: 18 U/L (ref 0–31)
BASOPHILS ABSOLUTE: 0.03 E9/L (ref 0–0.2)
BASOPHILS RELATIVE PERCENT: 0.6 % (ref 0–2)
BILIRUB SERPL-MCNC: 0.6 MG/DL (ref 0–1.2)
BUN BLDV-MCNC: 9 MG/DL (ref 6–23)
CALCIUM SERPL-MCNC: 8.4 MG/DL (ref 8.6–10.2)
CHLORIDE BLD-SCNC: 108 MMOL/L (ref 98–107)
CO2: 26 MMOL/L (ref 22–29)
CREAT SERPL-MCNC: 0.9 MG/DL (ref 0.5–1)
EOSINOPHILS ABSOLUTE: 0.21 E9/L (ref 0.05–0.5)
EOSINOPHILS RELATIVE PERCENT: 4.4 % (ref 0–6)
GFR AFRICAN AMERICAN: >60
GFR NON-AFRICAN AMERICAN: >60 ML/MIN/1.73
GLUCOSE BLD-MCNC: 94 MG/DL (ref 74–99)
HCT VFR BLD CALC: 34 % (ref 34–48)
HEMOGLOBIN: 11.3 G/DL (ref 11.5–15.5)
IMMATURE GRANULOCYTES #: 0.01 E9/L
IMMATURE GRANULOCYTES %: 0.2 % (ref 0–5)
LYMPHOCYTES ABSOLUTE: 1.99 E9/L (ref 1.5–4)
LYMPHOCYTES RELATIVE PERCENT: 41.5 % (ref 20–42)
MCH RBC QN AUTO: 31.6 PG (ref 26–35)
MCHC RBC AUTO-ENTMCNC: 33.2 % (ref 32–34.5)
MCV RBC AUTO: 95 FL (ref 80–99.9)
MONOCYTES ABSOLUTE: 0.48 E9/L (ref 0.1–0.95)
MONOCYTES RELATIVE PERCENT: 10 % (ref 2–12)
NEUTROPHILS ABSOLUTE: 2.07 E9/L (ref 1.8–7.3)
NEUTROPHILS RELATIVE PERCENT: 43.3 % (ref 43–80)
PDW BLD-RTO: 13.8 FL (ref 11.5–15)
PLATELET # BLD: 158 E9/L (ref 130–450)
PMV BLD AUTO: 11.5 FL (ref 7–12)
POTASSIUM REFLEX MAGNESIUM: 4 MMOL/L (ref 3.5–5)
RBC # BLD: 3.58 E12/L (ref 3.5–5.5)
SODIUM BLD-SCNC: 139 MMOL/L (ref 132–146)
TOTAL PROTEIN: 5.6 G/DL (ref 6.4–8.3)
WBC # BLD: 4.8 E9/L (ref 4.5–11.5)

## 2021-05-08 PROCEDURE — 94640 AIRWAY INHALATION TREATMENT: CPT

## 2021-05-08 PROCEDURE — G0378 HOSPITAL OBSERVATION PER HR: HCPCS

## 2021-05-08 PROCEDURE — 6370000000 HC RX 637 (ALT 250 FOR IP): Performed by: INTERNAL MEDICINE

## 2021-05-08 PROCEDURE — 80053 COMPREHEN METABOLIC PANEL: CPT

## 2021-05-08 PROCEDURE — 2580000003 HC RX 258: Performed by: EMERGENCY MEDICINE

## 2021-05-08 PROCEDURE — 2580000003 HC RX 258: Performed by: INTERNAL MEDICINE

## 2021-05-08 PROCEDURE — 36415 COLL VENOUS BLD VENIPUNCTURE: CPT

## 2021-05-08 PROCEDURE — 85025 COMPLETE CBC W/AUTO DIFF WBC: CPT

## 2021-05-08 PROCEDURE — 94664 DEMO&/EVAL PT USE INHALER: CPT

## 2021-05-08 PROCEDURE — 6360000002 HC RX W HCPCS: Performed by: INTERNAL MEDICINE

## 2021-05-08 RX ADMIN — BUDESONIDE 500 MCG: 0.5 SUSPENSION RESPIRATORY (INHALATION) at 08:35

## 2021-05-08 RX ADMIN — SODIUM CHLORIDE, PRESERVATIVE FREE 10 ML: 5 INJECTION INTRAVENOUS at 07:59

## 2021-05-08 RX ADMIN — TRAZODONE HYDROCHLORIDE 150 MG: 150 TABLET ORAL at 00:20

## 2021-05-08 RX ADMIN — ARFORMOTEROL TARTRATE 15 MCG: 15 SOLUTION RESPIRATORY (INHALATION) at 08:35

## 2021-05-08 RX ADMIN — FAMOTIDINE 20 MG: 20 TABLET ORAL at 00:20

## 2021-05-08 RX ADMIN — BUPROPION HYDROCHLORIDE 300 MG: 300 TABLET, EXTENDED RELEASE ORAL at 08:00

## 2021-05-08 RX ADMIN — FAMOTIDINE 20 MG: 20 TABLET ORAL at 08:00

## 2021-05-08 RX ADMIN — PANTOPRAZOLE SODIUM 40 MG: 40 TABLET, DELAYED RELEASE ORAL at 06:04

## 2021-05-08 RX ADMIN — SODIUM CHLORIDE: 9 INJECTION, SOLUTION INTRAVENOUS at 06:04

## 2021-05-08 NOTE — PROGRESS NOTES
Physical Therapy    Facility/Department: [facility] MED SURG/TELE      NAME: Mel Gottron  : 1945  MRN: [id number]    Date of Service: 2021    Order received for PT evaluation. Pt reports she is up independently in room. Denies any PT needs at this time.    Olivia PT [id number]

## 2021-05-08 NOTE — CONSULTS
Department of General Surgery - Adult  Surgical Service GEN SURGERY  Attending Consult Note      Reason for Consult:  GI bleed  Requesting Physician:  Dr. Pedro Talbert:  brbpr    History Obtained From:  patient    HISTORY OF PRESENT ILLNESS:                The patient is a 77 y.o. female who presents with brbpr since Thursday night/Friday am.  States she had 5-6 bm over that evening and noticed quite a bit of bright red blood in the toilet bowel. She denies any other issues. Does complain of some epigastric pain. Had colonoscopy/EGD in 2010 c/w diverticulosis and gastritis. Hx von Willebrand's dz. Denies n/v/f/c.  HD stable. No further bloody bm since admission. She had a large bm this am which was left in the toilet and it was brown with no evidence of gross blood.      Past Medical History:        Diagnosis Date    Arthritis     Chronic headaches     Depression     Fatigue     Fibromyalgia     GERD (gastroesophageal reflux disease)     History of blood transfusion     Hyperlipidemia     Nausea & vomiting     Von Willebrand disease ([address]      Past Surgical History:        Procedure Laterality Date    APPENDECTOMY      BACK SURGERY  2002    lumbar    CERVICAL FUSION  02/28/2021    CHOLECYSTECTOMY      COSMETIC SURGERY  2007    abdominoplasty    COSMETIC SURGERY  2009    facial lift    CYSTOSCOPY  3/8/16    HYSTERECTOMY      TONSILLECTOMY       Current Medications:   Current Facility-Administered Medications: 0.9 % sodium chloride infusion, , Intravenous, Continuous  buPROPion (WELLBUTRIN XL) extended release tablet 300 mg, 300 mg, Oral, Daily  ondansetron (ZOFRAN) tablet 4 mg, 4 mg, Oral, Daily PRN  PARoxetine (PAXIL) tablet 20 mg, 20 mg, Oral, QAM  traZODone (DESYREL) tablet 150 mg, 150 mg, Oral, Nightly  sodium chloride flush 0.9 % injection 10 mL, 10 mL, Intravenous, 2 times per day  sodium chloride flush 0.9 % injection 10 mL, 10 mL, Intravenous, PRN  0.9 % sodium chloride

## 2021-05-08 NOTE — DISCHARGE SUMMARY
Internal Medicine Discharge Summary    NAME: Monika Tolbert :  1945  MRN:  [id number] Tobias Echevarria MD    ADMITTED: 2021   DISCHARGED:  2021    ADMITTING PHYSICIAN: Shaheed Bailey MD    CONSULTANT(S):   IP CONSULT TO PRIMARY CARE PROVIDER  IP CONSULT TO SOCIAL WORK  IP CONSULT TO GENERAL SURGERY     ADMITTING DIAGNOSIS:   Melena [K92.1]     DISCHARGE DIAGNOSES:   Patient Active Problem List:     Allergic reaction     Syncope     Other and unspecified hyperlipidemia     Anxiety     Depression     Osteoarthritis     Pure hypercholesterolemia     Fibromyalgia     Lower abdominal pain     Diverticulitis of colon     Nausea     Irritable bowel syndrome with both constipation and diarrhea     Current mild episode of major depressive disorder without prior episode ([address]      BRIEF HISTORY OF PRESENT ILLNESS: Monika Tolbert is a 77 y.o. female patient of Zaire Weber MD who  has a past medical history of Arthritis, Chronic headaches, Depression, Fatigue, Fibromyalgia, GERD (gastroesophageal reflux disease), History of blood transfusion, Hyperlipidemia, Nausea & vomiting, and Von Willebrand disease ([address] who originally had concerns including Rectal Bleeding (onset last night, reporrtd 6 episodes with BM, denies blood thinners). at presentation on 2021, and was found to have Melena [K92.1] after workup. HOSPITAL COURSE: The patient was admitted for further monitoring and care for rectal bleed. General Surgery saw the patient and likely acute diverticular bleed with no further bleeding since admission. Labs stable. Will be followed by surgery for updated colonoscopy as outpatient. Patient felt well.       BRIEF PHYSICAL EXAMINATION AND LABORATORIES ON DAY OF DISCHARGE:  VITALS:  BP (!) 124/59   Pulse 78   Temp 97.9 °F (36.6 °C) (Oral)   Resp 14   Ht 5' 4\" (1.626 m)   Wt 145 lb 12.8 oz (66.1 kg)   LMP  (LMP Unknown)   SpO2 96%   BMI 25.03 kg/m²   See H&P for same day exam    LABS[de-identified]  Recent Labs     05/07/21 1717 05/08/21  0457    139   K 4.0 4.0    108*   CO2 27 26   BUN 14 9   CREATININE 0.9 0.9   GLUCOSE 92 94   CALCIUM 9.1 8.4*     Recent Labs     05/07/21 1717 05/08/21  0457   ALKPHOS 79 65   ALT 18 14   AST 19 18   PROT 7.0 5.6*   BILITOT 0.5 0.6   LABALBU 4.5 3.6     Recent Labs     05/07/21 1717 05/08/21 0457   WBC 6.5 4.8   RBC 4.29 3.58   HGB 13.1 11.3*   HCT 40.2 34.0   MCV 93.7 95.0   MCH 30.5 31.6   MCHC 32.6 33.2   RDW 13.6 13.8    158   MPV 11.6 11.5     No results found for: LABA1C  Lab Results   Component Value Date    INR 1.0 05/07/2021    INR 1.1 09/05/2012    PROTIME 11.2 05/07/2021    PROTIME 11.8 09/05/2012      Lab Results   Component Value Date    TSH 1.770 09/01/2015     Lab Results   Component Value Date    TRIG 274 04/24/2018    TRIG 314 01/23/2018    TRIG 104 09/21/2017    HDL 38 04/24/2018    HDL 47 01/23/2018    HDL 49 09/21/2017    LDLCALC 177 (A) 04/24/2018    LDLCALC 219 (A) 01/23/2018    LDLCALC 121 09/21/2017     Recent Labs     05/07/21  1717   TROPONINI <0.01     Recent Labs     05/07/21  1717   MG 2.1       No results for input(s): PHART, PO2ART, OBI6NES, GQG0KKQ, BEART, [id number] in the last 72 hours. Recent Labs     05/07/21  1717   LACTA 0.7     IMAGING:  Ct Abdomen Pelvis W Iv Contrast Additional Contrast? None    Result Date: 5/7/2021  EXAMINATION: CT OF THE ABDOMEN AND PELVIS WITH CONTRAST 5/7/2021 6:28 pm TECHNIQUE: CT of the abdomen and pelvis was performed with the administration of intravenous contrast. Multiplanar reformatted images are provided for review. Dose modulation, iterative reconstruction, and/or weight based adjustment of the mA/kV was utilized to reduce the radiation dose to as low as reasonably achievable. COMPARISON: None.  HISTORY: ORDERING SYSTEM PROVIDED HISTORY: abd pain, n/v TECHNOLOGIST PROVIDED HISTORY: Reason for exam:->abd pain, n/v Additional Contrast?->None Decision Support Exception - unselect if not a suspected or confirmed emergency medical condition->Emergency Medical Condition (MA) FINDINGS: Lower Chest: The lung bases are unremarkable. Heart size is normal. Organs: Multiple small hepatic cysts. .  The spleen, adrenal glands and, kidneys, pancreas are unremarkable. The gallbladder is absent. Intra and extrahepatic biliary duct dilatation most likely related to reservoir effect. GI/bowel: Sigmoid diverticulosis. Decompressed descending and sigmoid colon. Normal small bowel. Pelvis: Normal urinary bladder. Peritoneum/Retroperitoneum: No free air or free fluid. Bones/Soft Tissues:   Degenerative changes lumbar spine. Small fat containing umbilical hernia. No definitive findings to explain the patient's symptoms. Cholecystectomy. Intra and extrahepatic biliary duct dilatation most likely related to reservoir effect. Sigmoid diverticulosis. Xr Chest Portable    Result Date: 5/7/2021  EXAMINATION: ONE XRAY VIEW OF THE CHEST 5/7/2021 5:47 pm COMPARISON: None. HISTORY: ORDERING SYSTEM PROVIDED HISTORY: abd pain TECHNOLOGIST PROVIDED HISTORY: Reason for exam:->abd pain FINDINGS: The lungs are without acute focal process. There is no effusion or pneumothorax. The cardiomediastinal silhouette is without acute process. The osseous structures are without acute process. Partially visualized hardware in the cervical spine. No acute process. DISPOSITION:  The patient's condition is good. At this time the patient is without objective evidence of an acute process requiring continuing hospitalization or inpatient management. They are stable for discharge with outpatient follow-up. I have spoken with the patient and discussed the results of the current hospitalization, in addition to providing specific details for the plan of care and counseling regarding the diagnosis and prognosis.   The plan has been discussed in detail and they are aware of the specific conditions for emergent

## 2021-05-08 NOTE — H&P
Internal Medicine History & Physical     Chief Complaint: Rectal Bleeding (onset last night, reporrtd 6 episodes with BM, denies blood thinners)  Reason for Admission: GI bleed  Primary Care Physician: Ayaan Quarles MD  Code status: full    History of Present Illness  Skyla Maciel is a 77y.o. year old female who  has a past medical history of Arthritis, Chronic headaches, Depression, Fatigue, Fibromyalgia, GERD (gastroesophageal reflux disease), History of blood transfusion, Hyperlipidemia, Nausea & vomiting, and Von Willebrand disease ([facility] [address] .     The patient presented to the ER yesterday for episode of rectal bleeding. She had some epigatric pains and then the episodes of diarrhea. She has a history of VwF but not overall issues. No other symptoms. Overnight no further eipsodes of bleeding. Stools brown.        Therapy in ED-   Medications   0.9 % sodium chloride infusion ( Intravenous New Bag 5/8/21 0604)   buPROPion (WELLBUTRIN XL) extended release tablet 300 mg (300 mg Oral Given 5/8/21 0800)   ondansetron (ZOFRAN) tablet 4 mg (has no administration in time range)   PARoxetine (PAXIL) tablet 20 mg (20 mg Oral Not Given 5/8/21 0800)   traZODone (DESYREL) tablet 150 mg (150 mg Oral Given 5/8/21 0020)   sodium chloride flush 0.9 % injection 10 mL (10 mLs Intravenous Given 5/8/21 0759)   sodium chloride flush 0.9 % injection 10 mL (has no administration in time range)   0.9 % sodium chloride infusion (has no administration in time range)   potassium chloride (KLOR-CON M) extended release tablet 40 mEq (has no administration in time range)     Or   potassium bicarb-citric acid (EFFER-K) effervescent tablet 40 mEq (has no administration in time range)     Or   potassium chloride 10 mEq/100 mL IVPB (Peripheral Line) (has no administration in time range)   promethazine (PHENERGAN) tablet 12.5 mg (has no administration in time range)     Or   ondansetron (ZOFRAN) injection 4 mg (has no administration in time range)   senna (SENOKOT) tablet 8.6 mg (has no administration in time range)   famotidine (PEPCID) tablet 20 mg (20 mg Oral Given 5/8/21 0800)   acetaminophen (TYLENOL) tablet 650 mg (has no administration in time range)     Or   acetaminophen (TYLENOL) suppository 650 mg (has no administration in time range)   pantoprazole (PROTONIX) tablet 40 mg (40 mg Oral Given 5/8/21 0604)   Arformoterol Tartrate (BROVANA) nebulizer solution 15 mcg (15 mcg Nebulization Given 5/8/21 0835)     And   budesonide (PULMICORT) nebulizer suspension 500 mcg (500 mcg Nebulization Given 5/8/21 0835)   0.9 % sodium chloride bolus (0 mLs Intravenous Stopped 5/7/21 1905)   pantoprazole (PROTONIX) 80 mg in sodium chloride 0.9 % 100 mL bolus (0 mg Intravenous Stopped 5/7/21 1800)   tranexamic acid (CYKLOKAPRON) 1,000 mg in dextrose 5 % 100 mL IVPB (0 mg Intravenous Stopped 5/7/21 2156)   iopamidol (ISOVUE-370) 76 % injection 75 mL (75 mLs Intravenous Given 5/7/21 1827)       Past Medical History:   Diagnosis Date    Arthritis     Chronic headaches     Depression     Fatigue     Fibromyalgia     GERD (gastroesophageal reflux disease)     History of blood transfusion     Hyperlipidemia     Nausea & vomiting     Von Willebrand disease ([facility] [address]        Past Surgical History:   Procedure Laterality Date    APPENDECTOMY      BACK SURGERY  2002    lumbar    CERVICAL FUSION  02/28/2021    CHOLECYSTECTOMY      COSMETIC SURGERY  2007    abdominoplasty    COSMETIC SURGERY  2009    facial lift    CYSTOSCOPY  3/8/16    HYSTERECTOMY      TONSILLECTOMY         Family History  Family History   Problem Relation Age of Onset    Diabetes Mother     COPD Mother     Liver Disease Mother     Lung Cancer Father     High Blood Pressure Father     Lung Cancer Brother     Cancer Brother         thymus       Social History  Patient lives at home   TOBACCO:   reports that she has never smoked.  She has never used smokeless tobacco.  ETOH:   reports supple, trachea is midline  Back: ROM normal, no CVA tenderness. Lungs: clear to auscultation bilaterally, without rhonchi, crackle, wheezing, or rale, no retractions or use of accessory muscles  Heart: regular rate and regular rhythm, no murmur, normal S1, S2  Abdomen: soft, non-tender; bowel sounds normal; no masses, no organomegaly  Extremities: no lower extremity edema, extremities atraumatic, no cyanosis, no clubbing, 2+ pedal pulses palpated  Skin: normal color, normal texture, normal turgor, no rashes, no lesions  Neurologic:5/5 muscle strength throughout, normal muscle tone throughout, PERRLA, EOMI, face symmetric, hearing intact, tongue midline, speech appropriate without slurring. Labs-   Lab Results   Component Value Date    WBC 4.8 05/08/2021    HGB 11.3 (L) 05/08/2021    HCT 34.0 05/08/2021     05/08/2021     05/08/2021    K 4.0 05/08/2021     (H) 05/08/2021    CREATININE 0.9 05/08/2021    BUN 9 05/08/2021    CO2 26 05/08/2021    GLUCOSE 94 05/08/2021    ALT 14 05/08/2021    AST 18 05/08/2021    INR 1.0 05/07/2021     Lab Results   Component Value Date    CKTOTAL 76 09/01/2015    TROPONINI <0.01 05/07/2021     No results for input(s): BNP in the last 72 hours. Recent Radiological Studies:  Ct Abdomen Pelvis W Iv Contrast Additional Contrast? None    Result Date: 5/7/2021  EXAMINATION: CT OF THE ABDOMEN AND PELVIS WITH CONTRAST 5/7/2021 6:28 pm TECHNIQUE: CT of the abdomen and pelvis was performed with the administration of intravenous contrast. Multiplanar reformatted images are provided for review. Dose modulation, iterative reconstruction, and/or weight based adjustment of the mA/kV was utilized to reduce the radiation dose to as low as reasonably achievable. COMPARISON: None.  HISTORY: ORDERING SYSTEM PROVIDED HISTORY: abd pain, n/v TECHNOLOGIST PROVIDED HISTORY: Reason for exam:->abd pain, n/v Additional Contrast?->None Decision Support Exception - unselect if not a suspected or confirmed emergency medical condition->Emergency Medical Condition (MA) FINDINGS: Lower Chest: The lung bases are unremarkable. Heart size is normal. Organs: Multiple small hepatic cysts. .  The spleen, adrenal glands and, kidneys, pancreas are unremarkable. The gallbladder is absent. Intra and extrahepatic biliary duct dilatation most likely related to reservoir effect. GI/bowel: Sigmoid diverticulosis. Decompressed descending and sigmoid colon. Normal small bowel. Pelvis: Normal urinary bladder. Peritoneum/Retroperitoneum: No free air or free fluid. Bones/Soft Tissues:   Degenerative changes lumbar spine. Small fat containing umbilical hernia. No definitive findings to explain the patient's symptoms. Cholecystectomy. Intra and extrahepatic biliary duct dilatation most likely related to reservoir effect. Sigmoid diverticulosis. Xr Chest Portable    Result Date: 5/7/2021  EXAMINATION: ONE XRAY VIEW OF THE CHEST 5/7/2021 5:47 pm COMPARISON: None. HISTORY: ORDERING SYSTEM PROVIDED HISTORY: abd pain TECHNOLOGIST PROVIDED HISTORY: Reason for exam:->abd pain FINDINGS: The lungs are without acute focal process. There is no effusion or pneumothorax. The cardiomediastinal silhouette is without acute process. The osseous structures are without acute process. Partially visualized hardware in the cervical spine. No acute process.        Assessment/Plan  Patient is a 77 y.o. female who presents with Rectal Bleeding (onset last night, reporrtd 6 episodes with BM, denies blood thinners)      Full problem list includes:  Patient Active Problem List    Diagnosis Date Noted    Melena 05/07/2021    Irritable bowel syndrome with both constipation and diarrhea 08/07/2018    Current mild episode of major depressive disorder without prior episode ([facility] [address] 08/07/2018    Lower abdominal pain 07/13/2018    Diverticulitis of colon 07/13/2018    Nausea 07/13/2018    Fibromyalgia 12/03/2016    Pure hypercholesterolemia 10/11/2016    Other and unspecified hyperlipidemia 09/18/2015    Anxiety 09/18/2015    Depression 09/18/2015    Osteoarthritis 09/18/2015    Syncope 06/16/2015    Allergic reaction 07/07/2012       · Divertiular Bleed: GS consult appreciated, no further issues. H&H stable. · Routine labs in am  · SCD for DVT prophylaxis. · Please see orders for further management and care. · Likely discharge home today. Gabe Boxer, MD    5/8/2021  1:20 PM    NOTE:  This report was transcribed using voice recognition software. Every effort was made to ensure accuracy; however, inadvertent computerized transcription errors may be present.

## 2021-06-22 ENCOUNTER — HOSPITAL ENCOUNTER (OUTPATIENT)
Age: 76
Discharge: HOME | End: 2021-06-24

## 2021-06-22 PROCEDURE — 88305 TISSUE EXAM BY PATHOLOGIST: CPT

## 2021-06-22 PROCEDURE — 87081 CULTURE SCREEN ONLY: CPT

## 2021-06-23 LAB — CLOTEST: NORMAL

## 2021-10-05 ENCOUNTER — OFFICE VISIT (OUTPATIENT)
Dept: CARDIOLOGY CLINIC | Age: 76
End: 2021-10-05
Payer: MEDICARE

## 2021-10-05 VITALS
HEART RATE: 62 BPM | SYSTOLIC BLOOD PRESSURE: 107 MMHG | RESPIRATION RATE: 16 BRPM | BODY MASS INDEX: 26.05 KG/M2 | DIASTOLIC BLOOD PRESSURE: 62 MMHG | HEIGHT: 64 IN | WEIGHT: 152.6 LBS

## 2021-10-05 DIAGNOSIS — E78.00 PURE HYPERCHOLESTEROLEMIA: ICD-10-CM

## 2021-10-05 DIAGNOSIS — F10.10 ALCOHOL ABUSE: ICD-10-CM

## 2021-10-05 DIAGNOSIS — I35.1 AORTIC VALVE INSUFFICIENCY, ETIOLOGY OF CARDIAC VALVE DISEASE UNSPECIFIED: ICD-10-CM

## 2021-10-05 PROBLEM — F32.0 CURRENT MILD EPISODE OF MAJOR DEPRESSIVE DISORDER WITHOUT PRIOR EPISODE (HCC): Status: RESOLVED | Noted: 2018-08-07 | Resolved: 2021-10-05

## 2021-10-05 PROBLEM — R10.30 LOWER ABDOMINAL PAIN: Status: RESOLVED | Noted: 2018-07-13 | Resolved: 2021-10-05

## 2021-10-05 PROBLEM — R11.0 NAUSEA: Status: RESOLVED | Noted: 2018-07-13 | Resolved: 2021-10-05

## 2021-10-05 PROCEDURE — G8484 FLU IMMUNIZE NO ADMIN: HCPCS | Performed by: INTERNAL MEDICINE

## 2021-10-05 PROCEDURE — G8399 PT W/DXA RESULTS DOCUMENT: HCPCS | Performed by: INTERNAL MEDICINE

## 2021-10-05 PROCEDURE — 1123F ACP DISCUSS/DSCN MKR DOCD: CPT | Performed by: INTERNAL MEDICINE

## 2021-10-05 PROCEDURE — G8427 DOCREV CUR MEDS BY ELIG CLIN: HCPCS | Performed by: INTERNAL MEDICINE

## 2021-10-05 PROCEDURE — 1090F PRES/ABSN URINE INCON ASSESS: CPT | Performed by: INTERNAL MEDICINE

## 2021-10-05 PROCEDURE — 1036F TOBACCO NON-USER: CPT | Performed by: INTERNAL MEDICINE

## 2021-10-05 PROCEDURE — 4040F PNEUMOC VAC/ADMIN/RCVD: CPT | Performed by: INTERNAL MEDICINE

## 2021-10-05 PROCEDURE — 99204 OFFICE O/P NEW MOD 45 MIN: CPT | Performed by: INTERNAL MEDICINE

## 2021-10-05 PROCEDURE — 93000 ELECTROCARDIOGRAM COMPLETE: CPT | Performed by: INTERNAL MEDICINE

## 2021-10-05 PROCEDURE — G8417 CALC BMI ABV UP PARAM F/U: HCPCS | Performed by: INTERNAL MEDICINE

## 2021-10-05 PROCEDURE — 3017F COLORECTAL CA SCREEN DOC REV: CPT | Performed by: INTERNAL MEDICINE

## 2021-10-05 RX ORDER — HYDROCODONE BITARTRATE AND ACETAMINOPHEN 5; 325 MG/1; MG/1
TABLET ORAL
COMMUNITY
Start: 2021-09-07 | End: 2021-12-13 | Stop reason: ALTCHOICE

## 2021-10-05 RX ORDER — UBIDECARENONE 200 MG
200 CAPSULE ORAL DAILY
COMMUNITY

## 2021-10-05 RX ORDER — ESCITALOPRAM OXALATE 20 MG/1
20 TABLET ORAL DAILY
COMMUNITY
Start: 2021-09-16

## 2021-10-05 NOTE — PROGRESS NOTES
Chief Complaint   Patient presents with    Cardiac Valve Problem       Patient Active Problem List    Diagnosis Date Noted    Aortic regurgitation 10/05/2021     Overview Note:     A. Echo 6/10/21 ([facility]): \"mild to moderate\", EF 55%      Alcohol abuse 10/05/2021    Irritable bowel syndrome with both constipation and diarrhea 08/07/2018    Diverticulitis of colon 07/13/2018    Fibromyalgia 12/03/2016    Pure hypercholesterolemia 10/11/2016     Overview Note:     Intolerant to multiple statin agents      Anxiety 09/18/2015    Depression 09/18/2015       Current Outpatient Medications   Medication Sig Dispense Refill    coenzyme Q10 200 MG CAPS capsule Take 200 mg by mouth daily      escitalopram (LEXAPRO) 20 MG tablet 20 mg daily       HYDROcodone-acetaminophen (NORCO) 5-325 MG per tablet TAKE 1 TABLET BY MOUTH TWICE DAILY FOR 7 DAYS AS NEEDED FOR PAIN.  mirabegron (MYRBETRIQ) 25 MG TB24 Take 25 mg by mouth daily Indications: dose needs verified      buPROPion (WELLBUTRIN XL) 300 MG extended release tablet TAKE 1 TABLET BY MOUTH EVERY DAY IN THE MORNING 90 tablet 2    traZODone (DESYREL) 150 MG tablet One at hs 90 tablet 3     No current facility-administered medications for this visit.         No Known Allergies    Vitals:    10/05/21 1347   BP: 107/62   Pulse: 62   Resp: 16   Weight: 152 lb 9.6 oz (69.2 kg)   Height: 5' 4\" (1.626 m)       Social History     Socioeconomic History    Marital status:      Spouse name: Not on file    Number of children: Not on file    Years of education: Not on file    Highest education level: Not on file   Occupational History    Not on file   Tobacco Use    Smoking status: Never Smoker    Smokeless tobacco: Never Used   Vaping Use    Vaping Use: Never used   Substance and Sexual Activity    Alcohol use: Yes     Comment: 2x/week    Drug use: No    Sexual activity: Not on file   Other Topics Concern    Not on file   Social History Narrative    Not on file     Social Determinants of Health     Financial Resource Strain:     Difficulty of Paying Living Expenses:    Food Insecurity:     Worried About Running Out of Food in the Last Year:     [address] in the Last Year:    Transportation Needs:     Lack of Transportation (Medical):  Lack of Transportation (Non-Medical):    Physical Activity:     Days of Exercise per Week:     Minutes of Exercise per Session:    Stress:     Feeling of Stress :    Social Connections:     Frequency of Communication with Friends and Family:     Frequency of Social Gatherings with Friends and Family:     Attends Yazidism Services:     Active Member of Clubs or Organizations:     Attends Club or Organization Meetings:     Marital Status:    Intimate Partner Violence:     Fear of Current or Ex-Partner:     Emotionally Abused:     Physically Abused:     Sexually Abused:        Family History   Problem Relation Age of Onset    Diabetes Mother     COPD Mother     Liver Disease Mother     Lung Cancer Father     High Blood Pressure Father     Lung Cancer Brother     Cancer Brother         thymus         SUBJECTIVE: Devota Goodell presents to the office today for consult - dr el - for cardiac evaluation. Had echo at [address] recently - cliamed normal LV size and EF, trileaflet aortic valve with no stenosis and \"mild to moderate AI\", and \"mild MR\". Hx of normal ETT-N in 2019, and positive tilt table 2016 with SLNG only (MR)     She complains of dyspnea that is new this summer, but at same time both she and  state she is always on the go with activities, part time  work, etc and denies   exertional chest pressure/discomfort, fatigue, irregular heart beat, lower extremity edema, near-syncope, orthopnea, palpitations, paroxysmal nocturnal dyspnea, syncope and tachypnea.   Hx of significant alcohol abuse, depression      Review of Systems:   Heart: as above   Lungs: as above Eyes: denies changes in vision or discharge. Ears: denies changes in hearing or pain. Nose: denies epistaxis or masses   Throat: denies sore throat or trouble swallowing. Neuro: denies numbness, tingling, tremors. Skin: denies rashes or itching. : denies hematuria, dysuria   GI: denies vomiting, diarrhea   Psych: denies mood changed, anxiety, depression. all others negative. Physical Exam   /62   Pulse 62   Resp 16   Ht 5' 4\" (1.626 m)   Wt 152 lb 9.6 oz (69.2 kg)   LMP  (LMP Unknown)   BMI 26.19 kg/m²   Constitutional: Oriented to person, place, and time. Well-developed and well-nourished. No distress. Head: Normocephalic and atraumatic. Eyes: EOM are normal. Pupils are equal, round, and reactive to light. Neck: Normal range of motion. Neck supple. No hepatojugular reflux and no JVD present. Carotid bruit is not present. Cardiovascular: Normal rate, regular rhythm, normal heart sounds and intact distal pulses. Exam reveals no gallop and no friction rub. Grade II/VI short RADHA heard at aortic focus and LLSB,  No regurgitant murmurs heard. Pulmonary/Chest: Effort normal and breath sounds normal. No respiratory distress. No wheezes. No rales. Abdominal: Soft. Bowel sounds are normal. No distension and no mass. No tenderness. No rebound and no guarding. Musculoskeletal: Normal range of motion. No edema and no tenderness. Neurological: Alert and oriented to person, place, and time. Skin: Skin is warm and dry. No rash noted. Not diaphoretic. No erythema. Psychiatric: Normal mood and affect. Behavior is normal.     EKG:  normal EKG, normal sinus rhythm, rate 62, axis +51.     ASSESSMENT AND PLAN:  Patient Active Problem List   Diagnosis    Anxiety    Depression    Pure hypercholesterolemia    Fibromyalgia            Aortic regurgitation    Alcohol abuse     Patient with cardiac and non cardiac issues, including mild aortic and mitral valve disease, alcohol abuse and

## 2021-10-21 ENCOUNTER — TELEPHONE (OUTPATIENT)
Dept: CARDIOLOGY | Age: 76
End: 2021-10-21

## 2021-10-21 NOTE — TELEPHONE ENCOUNTER
Attempted to reach patient to schedule regular stress test ordered by Dr. Candy Forman, no answer at preferred number listed, left message on mobile number.

## 2021-11-01 ENCOUNTER — TELEPHONE (OUTPATIENT)
Dept: CARDIOLOGY | Age: 76
End: 2021-11-01

## 2021-11-01 NOTE — TELEPHONE ENCOUNTER
Left message for patient to call our office to schedule treadmill.   Electronically signed by Venkata Dukes on 11/1/2021 at 2:52 PM

## 2021-12-13 ENCOUNTER — HOSPITAL ENCOUNTER (EMERGENCY)
Age: 76
Discharge: HOME | End: 2021-12-13
Attending: EMERGENCY MEDICINE
Payer: MEDICARE

## 2021-12-13 ENCOUNTER — APPOINTMENT (OUTPATIENT)
Dept: GENERAL RADIOLOGY | Age: 76
End: 2021-12-13
Payer: MEDICARE

## 2021-12-13 VITALS
TEMPERATURE: 97.1 F | SYSTOLIC BLOOD PRESSURE: 142 MMHG | OXYGEN SATURATION: 97 % | RESPIRATION RATE: 14 BRPM | DIASTOLIC BLOOD PRESSURE: 58 MMHG | HEART RATE: 76 BPM

## 2021-12-13 DIAGNOSIS — S49.92XA INJURY OF LEFT UPPER EXTREMITY, INITIAL ENCOUNTER: Primary | ICD-10-CM

## 2021-12-13 PROCEDURE — 99283 EMERGENCY DEPT VISIT LOW MDM: CPT

## 2021-12-13 PROCEDURE — 73060 X-RAY EXAM OF HUMERUS: CPT

## 2021-12-13 RX ORDER — HYDROCODONE BITARTRATE AND ACETAMINOPHEN 5; 325 MG/1; MG/1
1 TABLET ORAL EVERY 8 HOURS PRN
Qty: 6 TABLET | Refills: 0 | Status: SHIPPED | OUTPATIENT
Start: 2021-12-13 | End: 2021-12-15

## 2021-12-13 ASSESSMENT — PAIN SCALES - GENERAL: PAINLEVEL_OUTOF10: 2

## 2021-12-13 NOTE — ED PROVIDER NOTES
HPI:  12/13/21, Time: 11:08 AM GI Craig is a 77 y.o. female presenting to the ED for pain to her left upper arm getting yesterday after reaching for something. Patient states that she was reaching for something when she suddenly felt a pain over her left bicep with associated swelling. Symptoms are worsened with palpation and movement and alleviated by nothing. She denies any fall or trauma to her arm. She takes no anticoagulation. She said no numbness or tingling and no fevers. She has an appointment for her rotator cuff with orthopedic surgery on Friday. Review of Systems:   Pertinent positives and negatives are stated within HPI, all other systems reviewed and are negative.          --------------------------------------------- PAST HISTORY ---------------------------------------------  Past Medical History:  has a past medical history of Arthritis, Chronic headaches, Depression, Fatigue, Fibromyalgia, GERD (gastroesophageal reflux disease), History of blood transfusion, Hyperlipidemia, Nausea & vomiting, and [facility] Past Surgical History:  has a past surgical history that includes Hysterectomy; Appendectomy; Cholecystectomy; Tonsillectomy; Cosmetic surgery (2007); Cosmetic surgery (2009); back surgery (2002); Cystoscopy (3/8/16); and cervical fusion (02/28/2021). Social History:  reports that she has never smoked. She has never used smokeless tobacco. She reports current alcohol use. She reports that she does not use drugs. Family History: family history includes COPD in her mother; Cancer in her brother; Diabetes in her mother; High Blood Pressure in her father; Liver Disease in her mother; Lung Cancer in her brother and father. The patients home medications have been reviewed. Allergies: Patient has no known allergies.     -------------------------------------------------- RESULTS -------------------------------------------------  All laboratory and radiology results have been personally reviewed by myself   LABS:  No results found for this visit on 12/13/21. RADIOLOGY:  Interpreted by Radiologist.  XR HUMERUS LEFT (MIN 2 VIEWS)   Final Result   No acute bony abnormality. MRI would be useful if symptoms persist.             ------------------------- NURSING NOTES AND VITALS REVIEWED ---------------------------   The nursing notes within the ED encounter and vital signs as below have been reviewed. BP (!) 142/58   Pulse 76   Temp 97.1 °F (36.2 °C) (Temporal)   Resp 14   LMP  (LMP Unknown)   SpO2 97%   Oxygen Saturation Interpretation: Normal      ---------------------------------------------------PHYSICAL EXAM--------------------------------------      Constitutional/General: Alert and oriented x3, well appearing, non toxic in NAD  Head: Normocephalic and atraumatic  Eyes: EOMI  Mouth: Oropharynx clear, handling secretions, no trismus  Neck: Supple, full ROM,   Pulmonary: Lungs clear to auscultation bilaterally, no wheezes, rales, or rhonchi. Not in respiratory distress  Cardiovascular:  Regular rate and rhythm, no murmurs, gallops, or rubs. 2+ distal pulses  Extremities: Moves all extremities x 4. Warm and well perfused. Left upper extremity-swelling and ecchymoses over bicep, flexor and extensor mechanism intact, soft and easily compressible compartments, strong radial pulse, normal sensation, no open wounds  Skin: warm and dry without rash  Neurologic: GCS 15, no focal motor or sensory deficits   Psych: Normal Affect. Behavior normal.      ------------------------------ ED COURSE/MEDICAL DECISION MAKING----------------------  Medications - No data to display    Medical Decision Making/ED COURSE:   Patient is a 28-year-old female who presents with what appears to be a biceps injury after reaching for something yesterday. She is neurovascularly intact on exam with no evidence of compartment syndrome. X-rays obtained showing no acute findings.  She will be provided with a sling and advised to ice and elevate her arm. She has an appointment with orthopedic surgery on Friday, and she was advised follow-up as scheduled. Strict ED return precautions discussed. Patient remained hemodynamically stable throughout ED course. Counseling: The emergency provider has spoken with the patient and discussed todays results, in addition to providing specific details for the plan of care and counseling regarding the diagnosis and prognosis. Questions are answered at this time and they are agreeable with the plan.      --------------------------------- IMPRESSION AND DISPOSITION ---------------------------------    IMPRESSION  1. Injury of left upper extremity, initial encounter        DISPOSITION  Disposition: Discharge to home  Patient condition is stable      NOTE: This report was transcribed using voice recognition software.  Every effort was made to ensure accuracy; however, inadvertent computerized transcription errors may be present    I, Jose Cunningham MD, am the primary provider of this record       Jose Cunningham MD  12/13/21 0602

## 2022-06-08 ENCOUNTER — INITIAL CONSULT (OUTPATIENT)
Dept: SURGERY | Age: 77
End: 2022-06-08

## 2022-06-08 VITALS
HEIGHT: 64 IN | OXYGEN SATURATION: 96 % | TEMPERATURE: 97.3 F | WEIGHT: 153.6 LBS | BODY MASS INDEX: 26.22 KG/M2 | SYSTOLIC BLOOD PRESSURE: 114 MMHG | HEART RATE: 65 BPM | DIASTOLIC BLOOD PRESSURE: 68 MMHG

## 2022-06-08 DIAGNOSIS — R23.8 FACIAL AGING: Primary | ICD-10-CM

## 2022-06-08 PROCEDURE — MISCP1 PLASTIC CONSULT I: Performed by: PLASTIC SURGERY

## 2022-06-08 NOTE — PROGRESS NOTES
Department of Plastic Surgery  Facial Cosmetic Consult Note          CHIEF COMPLAINT:  Facial jowls and neck laxity    History Obtained From:  patient    HISTORY OF PRESENT ILLNESS:                The patient is a 77 y.o. female who presents with concerns of facial aging. Specifically, she dislikes the return of her jowls and laxity and banding in the central neck. Furthermore, she dislikes the lines of the upper lips which she has on animation. She also brings to my attention asymmetry of her brow with the right brow lower set than the left. Patient does have a history of a facelift in 2009. She also had bilateral upper blepharoplasty and history of abdominoplasty. Patient has been satisfied with her previous facelift and noted gradual worsening of her jowl and neck area. Patient would like to avoid Botox and fillers to thwart the need to come in on a repeat basis. Past Medical History:    Past Medical History:   Diagnosis Date    Arthritis     Chronic headaches     Depression     Fatigue     Fibromyalgia     GERD (gastroesophageal reflux disease)     History of blood transfusion     Hyperlipidemia     Nausea & vomiting     Von Willebrand disease ([address]      Past Surgical History:    Past Surgical History:   Procedure Laterality Date    APPENDECTOMY      BACK SURGERY  2002    lumbar    CERVICAL FUSION  02/28/2021    CHOLECYSTECTOMY      COSMETIC SURGERY  2007    abdominoplasty    COSMETIC SURGERY  2009    facial lift    CYSTOSCOPY  3/8/16    HYSTERECTOMY (CERVIX STATUS UNKNOWN)      TONSILLECTOMY       Current Medications:   No current facility-administered medications for this visit. Allergies:  Patient has no known allergies.     Social History:   Social History     Socioeconomic History    Marital status:      Spouse name: Not on file    Number of children: Not on file    Years of education: Not on file    Highest education level: Not on file   Occupational History    Not on file   Tobacco Use    Smoking status: Never Smoker    Smokeless tobacco: Never Used   Vaping Use    Vaping Use: Never used   Substance and Sexual Activity    Alcohol use: Yes     Comment: 2x/week    Drug use: No    Sexual activity: Not on file   Other Topics Concern    Not on file   Social History Narrative    Not on file     Social Determinants of Health     Financial Resource Strain:     Difficulty of Paying Living Expenses: Not on file   Food Insecurity:     Worried About Running Out of Food in the Last Year: Not on file    Dominick of Food in the Last Year: Not on file   Transportation Needs:     Lack of Transportation (Medical): Not on file    Lack of Transportation (Non-Medical):  Not on file   Physical Activity:     Days of Exercise per Week: Not on file    Minutes of Exercise per Session: Not on file   Stress:     Feeling of Stress : Not on file   Social Connections:     Frequency of Communication with Friends and Family: Not on file    Frequency of Social Gatherings with Friends and Family: Not on file    Attends Latter-day Services: Not on file    Active Member of Clubs or Organizations: Not on file    Attends Club or Organization Meetings: Not on file    Marital Status: Not on file   Intimate Partner Violence:     Fear of Current or Ex-Partner: Not on file    Emotionally Abused: Not on file    Physically Abused: Not on file    Sexually Abused: Not on file   Housing Stability:     Unable to Pay for Housing in the Last Year: Not on file    Number of [place] in the Last Year: Not on file    Unstable Housing in the Last Year: Not on file     Family History:   Family History   Problem Relation Age of Onset    Diabetes Mother     COPD Mother     Liver Disease Mother     Lung Cancer Father     High Blood Pressure Father     Lung Cancer Brother     Cancer Brother         thymus       REVIEW OF SYSTEMS:    CONSTITUTIONAL:  negative for  fevers, chills, sweats and fatigue  RESPIRATORY:  negative for  dry cough, cough with sputum, dyspnea, wheezing and chest pain  CARDIOVASCULAR:  negative for  chest pain, dyspnea, palpitations, syncope  GASTROINTESTINAL:  negative for nausea, vomiting, change in bowel habits, diarrhea, constipation and abdominal pain  BEHAVIOR/PSYCH:  negative for poor appetite, increased appetite, decreased sleep and poor concentration      PHYSICAL EXAM:    VITALS:  /68 (Site: Left Upper Arm, Position: Sitting, Cuff Size: Medium Adult)   Pulse 65   Temp 97.3 °F (36.3 °C) (Temporal)   Ht 5' 4\" (1.626 m)   Wt 153 lb 9.6 oz (69.7 kg)   LMP  (LMP Unknown)   SpO2 96%   Breastfeeding No   BMI 26.37 kg/m²     Constitutional: She is oriented to person, place, and time. She appears well-developed and well-nourished. HENT:  Negative. Head: Normocephalic and atraumatic. Right Ear: External ear normal.   Left Ear: External ear normal.   Nose: Nose normal.   Mouth/Throat: Oropharynx is clear and moist.   Eyes: Conjunctivae and extraocular motions are normal. Pupils are equal, round, and reactive to light. Neck: Normal range of motion. Neck supple. Cardiovascular: Normal rate, regular rhythm, normal heart sounds and intact distal pulses. No murmur heard. Pulmonary/Chest: Effort normal and breath sounds normal.   Abdominal: Soft. Bowel sounds are normal. She exhibits no mass. No tenderness. Musculoskeletal: Normal range of motion. She exhibits no edema. Lymphadenopathy: She has no cervical adenopathy. Neurological: She is alert and oriented to person, place, and time. She has normal and symmetric cranial nerves II - XII  Skin: Warm and dry. FACE: Examination reveals mild actinic damage, transverse forehead rhytids, glabellar frown lines, crows feet, and deepened NLF. There is midface soft tissue decent as well as volume loss to the malar area. There is moderate jowling. NECK: There is excess skin present.  There is mild fat in the submental area superficial to the platysma. PERIORBITAL:    Asymmetric brow with lower sat on the right side. PERIORAL:    Fine rhytids of upper lip worse on animation. Present on repose    DATA:    Radiology Review: None pertinent    IMPRESSION/RECOMMENDATIONS:      The patient will benefit from    1) Secondary full facelift, SMAS technique  2) submental lipectomy and cervical tightening to address fat deposit and platysmal bands  3) endoscopic brow lift to help murillo symmetry  4) contour TRL to upper lip in conjunction with botulinum toxin and fillers. The patient would like to explore the secondary facelift and submental lipectomy and cervical tightening at this time. The risks, benefits and options were discussed with the pt. The risks included but not limited to pain, bleeding, infection, heavy scarring, damage to surrounding structures, neurovascular damage, fluid collections, asymmetry, and need for further procedures. All of Her questions were answered to her satisfaction. Photos taken with consent, chaperone present for entire visit. Patient will consider and let us know she would like to proceed. I will see her 1 more time before surgery to answer any last-minute questions and to clarify the final procedure plan. This document is generated, in part, by voice recognition software and thus  syntax and grammatical errors are possible.     Yoav Bonilla MD  2:38 PM  6/8/2022

## 2022-06-21 ENCOUNTER — HOSPITAL ENCOUNTER (OUTPATIENT)
Dept: GENERAL RADIOLOGY | Age: 77
Discharge: HOME | End: 2022-06-23
Payer: MEDICARE

## 2022-06-21 ENCOUNTER — APPOINTMENT (OUTPATIENT)
Dept: GENERAL RADIOLOGY | Age: 77
End: 2022-06-21
Payer: MEDICARE

## 2022-06-21 VITALS — WEIGHT: 148 LBS | HEIGHT: 64 IN | BODY MASS INDEX: 25.27 KG/M2

## 2022-06-21 DIAGNOSIS — Z12.31 VISIT FOR SCREENING MAMMOGRAM: ICD-10-CM

## 2022-06-21 PROCEDURE — 77063 BREAST TOMOSYNTHESIS BI: CPT

## 2022-07-03 ENCOUNTER — APPOINTMENT (OUTPATIENT)
Dept: GENERAL RADIOLOGY | Age: 77
End: 2022-07-03
Payer: MEDICARE

## 2022-07-03 ENCOUNTER — HOSPITAL ENCOUNTER (OUTPATIENT)
Age: 77
Discharge: HOME | End: 2022-07-05
Payer: MEDICARE

## 2022-07-03 ENCOUNTER — HOSPITAL ENCOUNTER (OUTPATIENT)
Dept: ULTRASOUND IMAGING | Age: 77
Discharge: HOME | End: 2022-07-05
Payer: MEDICARE

## 2022-07-03 ENCOUNTER — HOSPITAL ENCOUNTER (EMERGENCY)
Age: 77
Discharge: HOME | End: 2022-07-03
Payer: MEDICARE

## 2022-07-03 VITALS
TEMPERATURE: 97.7 F | SYSTOLIC BLOOD PRESSURE: 121 MMHG | OXYGEN SATURATION: 97 % | RESPIRATION RATE: 20 BRPM | DIASTOLIC BLOOD PRESSURE: 57 MMHG | WEIGHT: 148 LBS | HEART RATE: 61 BPM | HEIGHT: 64 IN | BODY MASS INDEX: 25.27 KG/M2

## 2022-07-03 DIAGNOSIS — M79.605 ACUTE PAIN OF LEFT LOWER EXTREMITY: ICD-10-CM

## 2022-07-03 DIAGNOSIS — M79.605 ACUTE PAIN OF LEFT LOWER EXTREMITY: Primary | ICD-10-CM

## 2022-07-03 PROCEDURE — 99283 EMERGENCY DEPT VISIT LOW MDM: CPT

## 2022-07-03 PROCEDURE — 73564 X-RAY EXAM KNEE 4 OR MORE: CPT

## 2022-07-03 PROCEDURE — 93971 EXTREMITY STUDY: CPT

## 2022-07-03 PROCEDURE — 93971 EXTREMITY STUDY: CPT | Performed by: RADIOLOGY

## 2022-07-03 PROCEDURE — 73610 X-RAY EXAM OF ANKLE: CPT

## 2022-07-03 RX ORDER — NAPROXEN 375 MG/1
375 TABLET ORAL 2 TIMES DAILY WITH MEALS
Qty: 20 TABLET | Refills: 0 | Status: SHIPPED | OUTPATIENT
Start: 2022-07-03

## 2022-07-03 RX ORDER — EVOLOCUMAB 140 MG/ML
INJECTION, SOLUTION SUBCUTANEOUS
COMMUNITY

## 2022-07-03 ASSESSMENT — PAIN DESCRIPTION - ORIENTATION: ORIENTATION: LEFT

## 2022-07-03 ASSESSMENT — PAIN DESCRIPTION - FREQUENCY: FREQUENCY: CONTINUOUS

## 2022-07-03 ASSESSMENT — PAIN - FUNCTIONAL ASSESSMENT
PAIN_FUNCTIONAL_ASSESSMENT: PREVENTS OR INTERFERES SOME ACTIVE ACTIVITIES AND ADLS
PAIN_FUNCTIONAL_ASSESSMENT: 0-10

## 2022-07-03 ASSESSMENT — PAIN DESCRIPTION - DESCRIPTORS: DESCRIPTORS: ACHING

## 2022-07-03 ASSESSMENT — PAIN DESCRIPTION - PAIN TYPE: TYPE: ACUTE PAIN

## 2022-07-03 ASSESSMENT — PAIN SCALES - GENERAL: PAINLEVEL_OUTOF10: 9

## 2022-07-03 ASSESSMENT — PAIN DESCRIPTION - ONSET: ONSET: ON-GOING

## 2022-07-03 ASSESSMENT — PAIN DESCRIPTION - LOCATION: LOCATION: LEG

## 2022-08-03 ENCOUNTER — OFFICE VISIT (OUTPATIENT)
Dept: SURGERY | Age: 77
End: 2022-08-03

## 2022-08-03 VITALS — RESPIRATION RATE: 18 BRPM | OXYGEN SATURATION: 97 % | HEART RATE: 70 BPM | TEMPERATURE: 97.4 F

## 2022-08-03 DIAGNOSIS — R23.8 FACIAL AGING: Primary | ICD-10-CM

## 2022-08-03 PROCEDURE — MISCOB OBAGI BLENDER: Performed by: PLASTIC SURGERY

## 2022-08-03 PROCEDURE — MISCOTC5 OBAGI TRETINOIN CREAM 0.05 20GM: Performed by: PLASTIC SURGERY

## 2022-08-03 NOTE — PROGRESS NOTES
Subjective: Follow up today [facility] facial laser options. We discussed facelifting procedures and neck lifting as well as deeper peels to around the mouth previously. She is hoping to do something to treat the entire face for improved skin appearance and fine lines and wrinkles    Objective:    Pulse 70   Temp 97.4 °F (36.3 °C) (Infrared)   Resp 18   LMP  (LMP Unknown)   SpO2 97%       Days has very mild actinic damage. Diffuse fine lines and wrinkles and faint solar lentigo. Dynamic lines of the upper lip in a radial fashion    Assessment:    Patient Active Problem List   Diagnosis    Anxiety    Depression    Pure hypercholesterolemia    Fibromyalgia    Diverticulitis of colon    Irritable bowel syndrome with both constipation and diarrhea    Aortic regurgitation    Alcohol abuse       Plan:     Patient will be a good candidate for combination laser peel to improve fine lines and wrinkles and increase collagen. Specifically educated patient not to expect facelift type result or neck lift type results. She states she completely understands. Discussed that many of the solar lentigo should fade with 1 treatment, however, she may need additional treatments to reach her expectations. She understands that some may be very deep and may not respond to this type of laser peeling      Risks of laser therapy were explained including bleeding, scarring, hyopigmentation, hyperpigmentation that can be worsened by sun exposure. There is a risk of herpes or bacterial infection. The patient is educated to utilize sun protection for 3-4 months after the procedure, understands that there will be some pain involved during the procedure and that there will be wounding and redness ranging from several days to weeks. Educated on post surgical care. This document is generated, in part, by voice recognition software and thus  syntax and grammatical errors are possible.     Ruby Prince MD  1:53 PM  8/3/2022

## 2022-08-18 NOTE — PROGRESS NOTES
two times a day with plain, lukewarm water and a gentle cleanser; (i.e. Cetaphil) beginning the morning after the treatment. Use your hands to gently apply the cleanser and water and finish by patting dry with a soft cloth. Be careful not to rub the treated area. After cleansing your face, reapply the occlusive barrier, (i.e. Aquaphor) taking care to cover all treated areas. The occlusive barrier is needed to provide a protective barrier that will hold moisture in the skin and provide protection to the skin from pollutants in the air as the skin heals. As a rule of thumb, the occlusive barrier is needed 1 day per 10 microns of skin or once skin has re-epithielized. For example: a 20 micron MLP = 2 days of wearing the occlusive barrier. Reapply the occlusive barrier as needed. Do not allow the treated area to dry out. Peeling and flaking generally occur within 24 hours post treatment and should be allowed to come off naturally. DO NOT PICK, RUB, OR FORCE OFF ANY SKIN DURING THE HEALING PROCESS. THIS COULD RESULT IN SCARRING AND INFECTION! Gently washing the skin more frequently will help to promote the peeling process. Avoid direct sunlight for up to 2 months post treatment. Once skin has healed ( no longer wearing the occlusive barrier) you may begin to wear makeup. If you are able to wear makeup, a sunblock should be worn on a daily basis to help prevent and hyperpigmentation issues that could be caused by direct and indirect sunlight. When showering, be sure to wash your hair behind you to avoid getting shampoo directly on the treated area. Avoid strenuous exercise and sweating until after skin has healed. Warning: There may be some degree of swelling immediately post treatment; however, if you have excessive swelling or any of the following signs of infection, you should contact the office immediately.   Signs of infection include:  Drainage-looks like pus   Increased warmth at or around the treated area  Fever of 101.5 or greater    Follow Up 3-7 days        This document is generated, in part, by voice recognition software and thus  syntax and grammatical errors are possible.     Corby Dunlap MD  10:14 AM  8/24/2022

## 2022-08-19 ENCOUNTER — TELEPHONE (OUTPATIENT)
Dept: SURGERY | Age: 77
End: 2022-08-19

## 2022-08-19 DIAGNOSIS — G89.18 PAIN FOLLOWING SURGERY OR PROCEDURE: Primary | ICD-10-CM

## 2022-08-19 NOTE — TELEPHONE ENCOUNTER
8/19/22 patient called office asked for instructions on  and tretinoin, she will apply  bid and apply tretinoin qhs. Patient will need ordered antiviral, valium, and pain medication to [facility] pharmacy, pt aware to  medications, start antiviral on Tuesday. Laser procedure on Wednesday 8/24/22.

## 2022-08-22 RX ORDER — ACYCLOVIR 400 MG/1
400 TABLET ORAL
Qty: 25 TABLET | Refills: 0 | Status: SHIPPED | OUTPATIENT
Start: 2022-08-22

## 2022-08-22 RX ORDER — DIAZEPAM 2 MG/1
TABLET ORAL
Qty: 4 TABLET | Refills: 0 | Status: SHIPPED | OUTPATIENT
Start: 2022-08-22 | End: 2022-08-29

## 2022-08-22 RX ORDER — HYDROCODONE BITARTRATE AND ACETAMINOPHEN 5; 325 MG/1; MG/1
1 TABLET ORAL EVERY 6 HOURS PRN
Qty: 4 TABLET | Refills: 0 | Status: SHIPPED | OUTPATIENT
Start: 2022-08-22 | End: 2022-08-29

## 2022-08-23 ENCOUNTER — TELEPHONE (OUTPATIENT)
Dept: SURGERY | Age: 77
End: 2022-08-23

## 2022-08-23 DIAGNOSIS — G89.18 PAIN FOLLOWING SURGERY OR PROCEDURE: Primary | ICD-10-CM

## 2022-08-23 RX ORDER — HYDROCODONE BITARTRATE AND ACETAMINOPHEN 5; 325 MG/1; MG/1
1 TABLET ORAL EVERY 6 HOURS PRN
Qty: 4 TABLET | Refills: 0 | Status: SHIPPED | OUTPATIENT
Start: 2022-08-23 | End: 2022-08-30

## 2022-08-23 RX ORDER — ACYCLOVIR 400 MG/1
400 TABLET ORAL
Qty: 25 TABLET | Refills: 0 | Status: SHIPPED | OUTPATIENT
Start: 2022-08-23

## 2022-08-23 RX ORDER — DIAZEPAM 2 MG/1
TABLET ORAL
Qty: 4 TABLET | Refills: 0 | Status: SHIPPED | OUTPATIENT
Start: 2022-08-23 | End: 2022-08-26

## 2022-08-23 NOTE — TELEPHONE ENCOUNTER
Patients pharmacy   was not updated on her last visit with our office     The correct pharmacy is now in patients chart please resubmit all medications today please thanks     Patient was notified of above and will check with her pharmacy later today to

## 2022-08-24 ENCOUNTER — OFFICE VISIT (OUTPATIENT)
Dept: SURGERY | Age: 77
End: 2022-08-24

## 2022-08-24 VITALS
WEIGHT: 150 LBS | HEART RATE: 69 BPM | TEMPERATURE: 97.9 F | BODY MASS INDEX: 25.61 KG/M2 | HEIGHT: 64 IN | OXYGEN SATURATION: 95 %

## 2022-08-24 DIAGNOSIS — R23.8 FACIAL AGING: Primary | ICD-10-CM

## 2022-08-24 PROCEDURE — MISCIPL14 FY BBL - FULL FACE: Performed by: PLASTIC SURGERY

## 2022-08-31 ENCOUNTER — TELEPHONE (OUTPATIENT)
Dept: SURGERY | Age: 77
End: 2022-08-31

## 2022-08-31 NOTE — TELEPHONE ENCOUNTER
Patient had laser on 8/24/22 would like to apply moisturizer not coming in for a check until 9/9/22. Please advise.

## 2022-09-09 ENCOUNTER — OFFICE VISIT (OUTPATIENT)
Dept: SURGERY | Age: 77
End: 2022-09-09

## 2022-09-09 VITALS — TEMPERATURE: 97.3 F | HEART RATE: 71 BPM | OXYGEN SATURATION: 97 %

## 2022-09-09 DIAGNOSIS — R23.8 FACIAL AGING: Primary | ICD-10-CM

## 2022-09-09 PROCEDURE — MISCPNC PLASTICS VISIT NO CHARGE: Performed by: PLASTIC SURGERY

## 2022-09-09 NOTE — PROGRESS NOTES
Subjective: Follow up today from full face combination laser peel. Denies fever, nausea, vomiting. Pain is 0 of 10. Patient notes no drainage from the face. no herpetic lesions present. Patient was compliant with cleansing and Aquaphor and voiced satisfaction with result to date. Objective:    Pulse 71   Temp 97.3 °F (36.3 °C) (Infrared)   LMP  (LMP Unknown)   SpO2 97%       Wound: Clean, re-epithelialized. Appropriate erythema. No herpetic lesions, no evidence for infection. Improved complexion. Appropriate minimal swelling          Assessment:    Facial aging s/p Er. YAG laser combination Micropeel and Profractional treatment    Plan:     Continue gentle cleansing until all scale has dissipated. Re-educated on the use of sunscreen of SPF 30 or greater and sumprotection    F/U and for another laser which she said she may do in January. She may commence tretinoin and hydroquinone at 3 weeks after her laser to keep up on her skin health and complexion. Call office with concerns or signs of infection. This document is generated, in part, by voice recognition software and thus  syntax and grammatical errors are possible.     Marley Subramanian MD  11:01 AM  9/9/2022

## 2022-11-08 ENCOUNTER — HOSPITAL ENCOUNTER (OUTPATIENT)
Dept: GENERAL RADIOLOGY | Age: 77
Discharge: HOME | End: 2022-11-10
Payer: MEDICARE

## 2022-11-08 ENCOUNTER — HOSPITAL ENCOUNTER (OUTPATIENT)
Age: 77
Discharge: HOME | End: 2022-11-10
Payer: MEDICARE

## 2022-11-08 DIAGNOSIS — R05.9 COUGH, UNSPECIFIED TYPE: ICD-10-CM

## 2022-11-08 PROCEDURE — 71046 X-RAY EXAM CHEST 2 VIEWS: CPT

## 2022-11-14 ENCOUNTER — HOSPITAL ENCOUNTER (OUTPATIENT)
Age: 77
Discharge: HOME | End: 2022-11-16
Payer: MEDICARE

## 2022-11-14 ENCOUNTER — HOSPITAL ENCOUNTER (OUTPATIENT)
Dept: ULTRASOUND IMAGING | Age: 77
Discharge: HOME | End: 2022-11-16
Payer: MEDICARE

## 2022-11-14 DIAGNOSIS — M79.605 PAIN OF LEFT LOWER EXTREMITY: ICD-10-CM

## 2022-11-14 PROCEDURE — 93971 EXTREMITY STUDY: CPT

## 2022-12-02 ENCOUNTER — OFFICE VISIT (OUTPATIENT)
Dept: PRIMARY CARE CLINIC | Age: 77
End: 2022-12-02
Payer: MEDICARE

## 2022-12-02 VITALS
RESPIRATION RATE: 24 BRPM | WEIGHT: 156.2 LBS | BODY MASS INDEX: 26.67 KG/M2 | HEIGHT: 64 IN | HEART RATE: 63 BPM | TEMPERATURE: 97.1 F | SYSTOLIC BLOOD PRESSURE: 123 MMHG | DIASTOLIC BLOOD PRESSURE: 78 MMHG | OXYGEN SATURATION: 98 %

## 2022-12-02 DIAGNOSIS — K21.9 GASTROESOPHAGEAL REFLUX DISEASE WITHOUT ESOPHAGITIS: ICD-10-CM

## 2022-12-02 DIAGNOSIS — R05.3 CHRONIC COUGH: Primary | ICD-10-CM

## 2022-12-02 PROCEDURE — 1123F ACP DISCUSS/DSCN MKR DOCD: CPT | Performed by: NEUROMUSCULOSKELETAL MEDICINE & OMM

## 2022-12-02 PROCEDURE — 87804 INFLUENZA ASSAY W/OPTIC: CPT | Performed by: NEUROMUSCULOSKELETAL MEDICINE & OMM

## 2022-12-02 PROCEDURE — 99213 OFFICE O/P EST LOW 20 MIN: CPT | Performed by: NEUROMUSCULOSKELETAL MEDICINE & OMM

## 2022-12-02 PROCEDURE — 87426 SARSCOV CORONAVIRUS AG IA: CPT | Performed by: NEUROMUSCULOSKELETAL MEDICINE & OMM

## 2022-12-02 RX ORDER — BROMPHENIRAMINE MALEATE, PSEUDOEPHEDRINE HYDROCHLORIDE, AND DEXTROMETHORPHAN HYDROBROMIDE 2; 30; 10 MG/5ML; MG/5ML; MG/5ML
5 SYRUP ORAL 4 TIMES DAILY PRN
Qty: 120 ML | Refills: 0 | Status: SHIPPED | OUTPATIENT
Start: 2022-12-02

## 2022-12-02 RX ORDER — ASPIRIN 81 MG/1
TABLET, COATED ORAL
COMMUNITY
Start: 2022-09-02 | End: 2022-12-02

## 2022-12-02 RX ORDER — OMEPRAZOLE 40 MG/1
40 CAPSULE, DELAYED RELEASE ORAL
Qty: 30 CAPSULE | Refills: 5 | Status: SHIPPED | OUTPATIENT
Start: 2022-12-02

## 2022-12-02 ASSESSMENT — PATIENT HEALTH QUESTIONNAIRE - PHQ9
1. LITTLE INTEREST OR PLEASURE IN DOING THINGS: 2
SUM OF ALL RESPONSES TO PHQ QUESTIONS 1-9: 8
10. IF YOU CHECKED OFF ANY PROBLEMS, HOW DIFFICULT HAVE THESE PROBLEMS MADE IT FOR YOU TO DO YOUR WORK, TAKE CARE OF THINGS AT HOME, OR GET ALONG WITH OTHER PEOPLE: 0
7. TROUBLE CONCENTRATING ON THINGS, SUCH AS READING THE NEWSPAPER OR WATCHING TELEVISION: 0
4. FEELING TIRED OR HAVING LITTLE ENERGY: 3
SUM OF ALL RESPONSES TO PHQ QUESTIONS 1-9: 8
2. FEELING DOWN, DEPRESSED OR HOPELESS: 3
6. FEELING BAD ABOUT YOURSELF - OR THAT YOU ARE A FAILURE OR HAVE LET YOURSELF OR YOUR FAMILY DOWN: 0
8. MOVING OR SPEAKING SO SLOWLY THAT OTHER PEOPLE COULD HAVE NOTICED. OR THE OPPOSITE, BEING SO FIGETY OR RESTLESS THAT YOU HAVE BEEN MOVING AROUND A LOT MORE THAN USUAL: 0
9. THOUGHTS THAT YOU WOULD BE BETTER OFF DEAD, OR OF HURTING YOURSELF: 0
SUM OF ALL RESPONSES TO PHQ QUESTIONS 1-9: 8
5. POOR APPETITE OR OVEREATING: 0
3. TROUBLE FALLING OR STAYING ASLEEP: 0
SUM OF ALL RESPONSES TO PHQ9 QUESTIONS 1 & 2: 5
SUM OF ALL RESPONSES TO PHQ QUESTIONS 1-9: 8

## 2022-12-02 ASSESSMENT — ENCOUNTER SYMPTOMS
BLOOD IN STOOL: 0
SHORTNESS OF BREATH: 1
RHINORRHEA: 0
SINUS PAIN: 0
ABDOMINAL DISTENTION: 0
VOMITING: 0
VOICE CHANGE: 0
DIARRHEA: 0
SINUS PRESSURE: 0
SORE THROAT: 0
NAUSEA: 0
CHEST TIGHTNESS: 0
TROUBLE SWALLOWING: 0
CHOKING: 0
WHEEZING: 0
CONSTIPATION: 0
ABDOMINAL PAIN: 0
COUGH: 1

## 2022-12-02 NOTE — PROGRESS NOTES
Gianna Clay (:  1945) is a 69 y.o. female,Established patient, here for evaluation of the following chief complaint(s):  Cough (X 3 months at least//) and Shortness of Breath (X 3 mo/)         ASSESSMENT/PLAN:  1. Chronic cough  Assessment & Plan:  She has not been on Bromfed before we will go ahead and try 1 to 2-week trial of Bromfed-DM 5 cc every 6 hours for cough congestion. She will likely need further work-up and pulmonary evaluation soon. Orders:  -     POCT Influenza A/B  -     POCT COVID-19, Antigen  -     brompheniramine-pseudoephedrine-DM (BROMFED DM) 2-30-10 MG/5ML syrup; Take 5 mLs by mouth 4 times daily as needed for Congestion or Cough, Disp-120 mL, R-0Normal  2. Gastroesophageal reflux disease without esophagitis  Assessment & Plan:   I will start omeprazole 40 mg daily to see if this does not help the chronic cough issue. Orders:  -     omeprazole (PRILOSEC) 40 MG delayed release capsule; Take 1 capsule by mouth every morning (before breakfast), Disp-30 capsule, R-5Normal      Return if symptoms worsen or fail to improve. Subjective   SUBJECTIVE/OBJECTIVE:  HPI 78-year-old female here at walk-in clinic. Patient's had a 3-month history of persistent cough. Patient's also had intermittent shortness of breath. She denies fever, sweats and or chills. Patient's had a chest x-ray done which was normal back on 2022. I suspect she may have some heartburn related cough. So I started her on some omeprazole 40 mg daily. If this does not resolve her cough I highly suggested that she get a pulmonary evaluation. With the likelihood of her needing some pulmonary function test.  Vitals today were stable she was afebrile her pulse ox on room air was 98% respiratory rate was 24,  temperature 97.2. She denies any history of asthma, bronchitis, and or COPD. Patient is a non-smoker she never smoked her entire life. She does not come in contact with secondhand smoke. She recently took a Medrol Dosepak for some knee pain and she states that did not help her cough at all. So we will go ahead and give her Bromfed-DM 5 cc every 6 hours. I told her to return to her primary care physician in 1 to 2 weeks if signs and symptoms have not improved or worsened. Review of Systems   Constitutional:  Negative for activity change, appetite change, chills, diaphoresis, fatigue and fever. HENT:  Negative for congestion, ear pain, nosebleeds, postnasal drip, rhinorrhea, sinus pressure, sinus pain, sneezing, sore throat, trouble swallowing and voice change. Eyes:  Negative for visual disturbance. Respiratory:  Positive for cough (thick productive cough, clear over the 3-4 months. patient does not smoke. Recently had CXR that was negative. Seen by PCP about 1 month ago and given script for Tessalon Pearles and cough medication.) and shortness of breath. Negative for choking, chest tightness and wheezing. Cardiovascular:  Negative for chest pain, palpitations and leg swelling. Gastrointestinal:  Negative for abdominal distention, abdominal pain, blood in stool, constipation, diarrhea, nausea and vomiting. Neurological:  Negative for dizziness, weakness, light-headedness and headaches. Objective   /78   Pulse 63   Temp 97.1 °F (36.2 °C) (Temporal)   Resp 24   Ht 5' 4\" (1.626 m)   Wt 156 lb 3.2 oz (70.9 kg)   LMP  (LMP Unknown)   SpO2 98%   BMI 26.81 kg/m²     Physical Exam  Vitals and nursing note reviewed. Constitutional:       General: She is not in acute distress. Appearance: Normal appearance. She is not ill-appearing or diaphoretic. HENT:      Head: Normocephalic and atraumatic. Eyes:      General: No scleral icterus. Right eye: No discharge. Left eye: No discharge. Conjunctiva/sclera: Conjunctivae normal.   Cardiovascular:      Rate and Rhythm: Normal rate and regular rhythm. Pulses: Normal pulses.       Heart sounds: Normal heart sounds. No murmur heard. No friction rub. No gallop. Pulmonary:      Effort: Pulmonary effort is normal. No respiratory distress. Breath sounds: Normal breath sounds. No stridor. No wheezing, rhonchi or rales. Lymphadenopathy:      Cervical: No cervical adenopathy. Neurological:      Mental Status: She is alert and oriented to person, place, and time. Psychiatric:         Mood and Affect: Mood normal.         Behavior: Behavior normal.           Past Medical History:   Diagnosis Date    Arthritis     Chronic headaches     Depression     Fatigue     Fibromyalgia     GERD (gastroesophageal reflux disease)     History of blood transfusion     Hyperlipidemia     Nausea & vomiting     Von Willebrand disease         Past Surgical History:   Procedure Laterality Date    APPENDECTOMY      BACK SURGERY  2002    lumbar    CERVICAL FUSION  02/28/2021    CHOLECYSTECTOMY      COSMETIC SURGERY  2007    abdominoplasty    COSMETIC SURGERY  2009    facial lift    CYSTOSCOPY  3/8/16    HYSTERECTOMY (CERVIX STATUS UNKNOWN)      ROTATOR CUFF REPAIR Left 01/27/2022    TONSILLECTOMY          The ASCVD Risk score (Mini BRAY, et al., 2019) failed to calculate for the following reasons:    Cannot find a previous HDL lab    Cannot find a previous total cholesterol lab     Educational materials and/or home exercises printed for patient's review and were included inpatient instructions on his/her After Visit Summary and given to patient at the end of visit.     regarding above diagnosis, including possible risks and complications,  especially if left uncontrolled. Counseled regarding the possible side effects, risks, benefits and alternatives to treatment; patient and/or guardian verbalizes understanding, agrees, feels comfortable with and wishes to proceed withabove treatment plan.      Advised patient to call with any new medication issues, and read all Rx infofrom pharmacy to assure aware of all possible

## 2022-12-03 NOTE — ASSESSMENT & PLAN NOTE
She has not been on Bromfed before we will go ahead and try 1 to 2-week trial of Bromfed-DM 5 cc every 6 hours for cough congestion. She will likely need further work-up and pulmonary evaluation soon.

## 2022-12-22 ENCOUNTER — TELEPHONE (OUTPATIENT)
Dept: CARDIOLOGY CLINIC | Age: 77
End: 2022-12-22

## 2022-12-22 NOTE — TELEPHONE ENCOUNTER
Patient called and stated that she was supposed to have a stress test done a year ago and she stated she was never scheduled. Should this be reordered or do you need to see patient again.   Please advise

## 2022-12-23 DIAGNOSIS — R06.02 SOB (SHORTNESS OF BREATH): ICD-10-CM

## 2022-12-23 DIAGNOSIS — I35.1 AORTIC VALVE INSUFFICIENCY, ETIOLOGY OF CARDIAC VALVE DISEASE UNSPECIFIED: Primary | ICD-10-CM

## 2023-01-12 ENCOUNTER — OFFICE VISIT (OUTPATIENT)
Dept: CARDIOLOGY CLINIC | Age: 78
End: 2023-01-12
Payer: MEDICARE

## 2023-01-12 VITALS
RESPIRATION RATE: 18 BRPM | HEART RATE: 62 BPM | WEIGHT: 159.8 LBS | DIASTOLIC BLOOD PRESSURE: 70 MMHG | BODY MASS INDEX: 27.28 KG/M2 | SYSTOLIC BLOOD PRESSURE: 125 MMHG | HEIGHT: 64 IN

## 2023-01-12 DIAGNOSIS — I35.1 AORTIC VALVE INSUFFICIENCY, ETIOLOGY OF CARDIAC VALVE DISEASE UNSPECIFIED: Primary | ICD-10-CM

## 2023-01-12 DIAGNOSIS — R06.09 DOE (DYSPNEA ON EXERTION): ICD-10-CM

## 2023-01-12 PROCEDURE — G8484 FLU IMMUNIZE NO ADMIN: HCPCS | Performed by: INTERNAL MEDICINE

## 2023-01-12 PROCEDURE — 93000 ELECTROCARDIOGRAM COMPLETE: CPT | Performed by: INTERNAL MEDICINE

## 2023-01-12 PROCEDURE — G8417 CALC BMI ABV UP PARAM F/U: HCPCS | Performed by: INTERNAL MEDICINE

## 2023-01-12 PROCEDURE — 1123F ACP DISCUSS/DSCN MKR DOCD: CPT | Performed by: INTERNAL MEDICINE

## 2023-01-12 PROCEDURE — G8399 PT W/DXA RESULTS DOCUMENT: HCPCS | Performed by: INTERNAL MEDICINE

## 2023-01-12 PROCEDURE — 1090F PRES/ABSN URINE INCON ASSESS: CPT | Performed by: INTERNAL MEDICINE

## 2023-01-12 PROCEDURE — G8427 DOCREV CUR MEDS BY ELIG CLIN: HCPCS | Performed by: INTERNAL MEDICINE

## 2023-01-12 PROCEDURE — 99214 OFFICE O/P EST MOD 30 MIN: CPT | Performed by: INTERNAL MEDICINE

## 2023-01-12 PROCEDURE — 1036F TOBACCO NON-USER: CPT | Performed by: INTERNAL MEDICINE

## 2023-01-12 RX ORDER — HYDROCODONE BITARTRATE AND ACETAMINOPHEN 5; 325 MG/1; MG/1
TABLET ORAL
COMMUNITY
Start: 2022-12-22

## 2023-01-12 RX ORDER — ONDANSETRON 4 MG/1
TABLET, FILM COATED ORAL
COMMUNITY
Start: 2022-12-13

## 2023-01-12 RX ORDER — MECLIZINE HYDROCHLORIDE 25 MG/1
TABLET ORAL
COMMUNITY
Start: 2022-12-14

## 2023-01-12 NOTE — PROGRESS NOTES
Chief Complaint   Patient presents with    Valvular Heart Disease       Patient Active Problem List    Diagnosis Date Noted    Chronic cough 12/02/2022    Gastroesophageal reflux disease without esophagitis 12/02/2022    Aortic regurgitation 10/05/2021     Overview Note:     A. Echo 6/10/21 ([facility]): \"mild to moderate\", EF 55%      Alcohol abuse 10/05/2021    Irritable bowel syndrome with both constipation and diarrhea 08/07/2018    Diverticulitis of colon 07/13/2018    Fibromyalgia 12/03/2016    Pure hypercholesterolemia 10/11/2016     Overview Note:     Intolerant to multiple statin agents      Anxiety 09/18/2015    Depression 09/18/2015       Current Outpatient Medications   Medication Sig Dispense Refill    HYDROcodone-acetaminophen (NORCO) 5-325 MG per tablet TAKE 1 TABLET BY MOUTH EVERY 6 HOURS FOR 7 DAYS AS NEEDED FOR PAIN      meclizine (ANTIVERT) 25 MG tablet TAKE 1 TABLET BY MOUTH EVERY 8 HOURS AS NEEDED FOR DIZZINESS      ondansetron (ZOFRAN) 4 MG tablet TAKE 1 TABLET BY MOUTH EVERY 6 HOURS AS NEEDED FOR NAUSEA      omeprazole (PRILOSEC) 40 MG delayed release capsule Take 1 capsule by mouth every morning (before breakfast) 30 capsule 5    brompheniramine-pseudoephedrine-DM (BROMFED DM) 2-30-10 MG/5ML syrup Take 5 mLs by mouth 4 times daily as needed for Congestion or Cough 120 mL 0    Evolocumab (REPATHA) 140 MG/ML SOSY Inject into the skin      escitalopram (LEXAPRO) 20 MG tablet 20 mg daily       mirabegron (MYRBETRIQ) 25 MG TB24 Take 25 mg by mouth daily Indications: dose needs verified      buPROPion (WELLBUTRIN XL) 300 MG extended release tablet TAKE 1 TABLET BY MOUTH EVERY DAY IN THE MORNING 90 tablet 2    traZODone (DESYREL) 150 MG tablet One at hs 90 tablet 3     Current Facility-Administered Medications   Medication Dose Route Frequency Provider Last Rate Last Admin    perflutren lipid microspheres (DEFINITY) injection 1.5 mL  1.5 mL IntraVENous ONCE PRN MD stoney Howell (LEXISCAN) injection 0.4 mg  0.4 mg IntraVENous ONCE PRN Anny Rice MD            Allergies   Allergen Reactions    Statins      Other reaction(s): Muscle Pain, Joint Pain       Vitals:    01/12/23 0851   BP: 125/70   Pulse: 62   Resp: 18   Weight: 159 lb 12.8 oz (72.5 kg)   Height: 5' 4\" (1.626 m)       Social History     Socioeconomic History    Marital status:      Spouse name: Not on file    Number of children: Not on file    Years of education: Not on file    Highest education level: Not on file   Occupational History    Not on file   Tobacco Use    Smoking status: Never    Smokeless tobacco: Never   Vaping Use    Vaping Use: Never used   Substance and Sexual Activity    Alcohol use: Yes     Comment: 2x/week    Drug use: No    Sexual activity: Not on file   Other Topics Concern    Not on file   Social History Narrative    Not on file     Social Determinants of Health     Financial Resource Strain: Not on file   Food Insecurity: Not on file   Transportation Needs: Not on file   Physical Activity: Not on file   Stress: Not on file   Social Connections: Not on file   Intimate Partner Violence: Not on file   Housing Stability: Not on file       Family History   Problem Relation Age of Onset    Diabetes Mother     COPD Mother     Liver Disease Mother     Lung Cancer Father     High Blood Pressure Father     Lung Cancer Brother     Cancer Brother         thymus    Breast Cancer Paternal Aunt     Breast Cancer Paternal Aunt     Lung Cancer Paternal Aunt     Stomach Cancer Paternal Uncle          SUBJECTIVE: Allison Lemus presents to the office today for re-evaluation and pre-op assessment prior to knee surgery in March. Has had a shoulder operation since we last met, no issues.     Hx of normal ETT-N in 2019, and positive tilt table 2016 with SLNG only (MR)     She complains of dyspnea that is consistent   Hx of significant alcohol abuse, depression        Physical Exam   /70   Pulse 62   Resp 18   Ht 5' 4\" (1.626 m)   Wt 159 lb 12.8 oz (72.5 kg)   LMP  (LMP Unknown)   BMI 27.43 kg/m²   Constitutional: Oriented to person, place, and time. Well-developed and well-nourished. No distress. Neck: no JVD present. Carotid bruit is not present. Cardiovascular: Normal rate, regular rhythm, normal heart sounds and intact distal pulses. Exam reveals no gallop and no friction rub. Grade II/VI short RADHA heard at aortic focus and LLSB,  No regurgitant murmurs heard. Pulmonary/Chest: Effort normal and breath sounds normal. No respiratory distress. No wheezes. No rales. Abdominal: Soft. Bowel sounds are normal. No distension and no mass  Musculoskeletal: No edema   Neurological: Alert and oriented to person, place, and time. Skin: Skin is warm and dry. No rash noted. Not diaphoretic. No erythema. Psychiatric: Normal mood and affect.  Behavior is normal.     EKG:  normal EKG, normal sinus rhythm,     ASSESSMENT AND PLAN:  Patient Active Problem List   Diagnosis    Anxiety    Depression    Pure hypercholesterolemia    Fibromyalgia            Aortic regurgitation    Alcohol abuse     Patient with cardiac and non cardiac issues, including mild aortic and mitral valve disease, alcohol abuse and depression  Claim of disabling GEE - exam is unremarkable for severe valvular lesions, and no evidence of chf, arrhythmias or ischemic heart disease on ekg  Will do lexiscan and echo               Tahmina Valdivia M.D  [id number] [facility]

## 2023-01-20 ENCOUNTER — TELEPHONE (OUTPATIENT)
Dept: CARDIOLOGY | Age: 78
End: 2023-01-20

## 2023-01-20 NOTE — TELEPHONE ENCOUNTER
Spoke with patient and confirmed Lexiscan stress test appointment on January 24, 2023 at 0730. Instructions for test,  , and COVID-19 preprocedure information reviewed with patient, questions answered. Patient verbalized understanding.

## 2023-01-23 ENCOUNTER — TELEPHONE (OUTPATIENT)
Dept: CARDIOLOGY | Age: 78
End: 2023-01-23

## 2023-01-23 NOTE — TELEPHONE ENCOUNTER
CALLED PATIENT AND SPOKE WITH  HER . WE NEED TO CANCEL  STRESS TEST THAT WAS SCHEDULED FOR 01-24-23. DUE TO INS PENDING. RESCHEDULED FOR THE FOLLOWING WEEK 02-02-23.    Electronically signed by Ludy Eller on 1/23/2023 at 10:30 AM

## 2023-01-24 ENCOUNTER — HOSPITAL ENCOUNTER (OUTPATIENT)
Dept: CARDIOLOGY | Age: 78
Discharge: HOME | End: 2023-01-24
Payer: MEDICARE

## 2023-01-24 ENCOUNTER — TELEPHONE (OUTPATIENT)
Dept: CARDIOLOGY CLINIC | Age: 78
End: 2023-01-24

## 2023-01-24 DIAGNOSIS — I35.1 AORTIC VALVE INSUFFICIENCY, ETIOLOGY OF CARDIAC VALVE DISEASE UNSPECIFIED: ICD-10-CM

## 2023-01-24 LAB
LV EF: 55 %
LVEF MODALITY: NORMAL

## 2023-01-24 PROCEDURE — 93306 TTE W/DOPPLER COMPLETE: CPT

## 2023-01-24 NOTE — TELEPHONE ENCOUNTER
----- Message from Jhon Kwon MD sent at 1/24/2023  2:39 PM EST -----  Echo with only mild leak of aortic valve, strong heart   Await lexiscan    ----- Message -----  FromAlbino Sheets Incoming Cardiology Results From [facility] [place]  Sent: 1/24/2023   2:32 PM EST  To: Jhon Kwon MD

## 2023-01-31 ENCOUNTER — TELEPHONE (OUTPATIENT)
Dept: CARDIOLOGY | Age: 78
End: 2023-01-31

## 2023-01-31 NOTE — TELEPHONE ENCOUNTER
Spoke w/ pt to confirm stress test for 2/2/23, at 0730. Instructions given and medications reviewed. No medication holds. All questions answered.

## 2023-02-02 ENCOUNTER — HOSPITAL ENCOUNTER (OUTPATIENT)
Dept: CARDIOLOGY | Age: 78
Discharge: HOME | End: 2023-02-02
Payer: MEDICARE

## 2023-02-02 VITALS
RESPIRATION RATE: 16 BRPM | WEIGHT: 155 LBS | DIASTOLIC BLOOD PRESSURE: 68 MMHG | HEIGHT: 64 IN | HEART RATE: 64 BPM | BODY MASS INDEX: 26.46 KG/M2 | SYSTOLIC BLOOD PRESSURE: 114 MMHG

## 2023-02-02 DIAGNOSIS — R06.09 DOE (DYSPNEA ON EXERTION): ICD-10-CM

## 2023-02-02 DIAGNOSIS — I35.1 AORTIC VALVE INSUFFICIENCY, ETIOLOGY OF CARDIAC VALVE DISEASE UNSPECIFIED: ICD-10-CM

## 2023-02-02 DIAGNOSIS — R06.02 SOB (SHORTNESS OF BREATH): ICD-10-CM

## 2023-02-02 PROCEDURE — 93017 CV STRESS TEST TRACING ONLY: CPT

## 2023-02-02 PROCEDURE — 78452 HT MUSCLE IMAGE SPECT MULT: CPT

## 2023-02-02 PROCEDURE — 6360000002 HC RX W HCPCS: Performed by: INTERNAL MEDICINE

## 2023-02-02 PROCEDURE — A9500 TC99M SESTAMIBI: HCPCS | Performed by: INTERNAL MEDICINE

## 2023-02-02 PROCEDURE — 2580000003 HC RX 258: Performed by: INTERNAL MEDICINE

## 2023-02-02 PROCEDURE — 3430000000 HC RX DIAGNOSTIC RADIOPHARMACEUTICAL: Performed by: INTERNAL MEDICINE

## 2023-02-02 RX ORDER — DEXTROMETHORPHAN HYDROBROMIDE AND PROMETHAZINE HYDROCHLORIDE 15; 6.25 MG/5ML; MG/5ML
SYRUP ORAL
COMMUNITY
Start: 2022-11-14 | End: 2023-02-02 | Stop reason: SDUPTHER

## 2023-02-02 RX ORDER — SODIUM CHLORIDE 0.9 % (FLUSH) 0.9 %
10 SYRINGE (ML) INJECTION PRN
Status: DISCONTINUED | OUTPATIENT
Start: 2023-02-02 | End: 2023-02-03 | Stop reason: HOSPADM

## 2023-02-02 RX ORDER — BENZONATATE 200 MG/1
CAPSULE ORAL
COMMUNITY
Start: 2022-12-09

## 2023-02-02 RX ORDER — FLUTICASONE PROPIONATE 110 UG/1
AEROSOL, METERED RESPIRATORY (INHALATION)
COMMUNITY
Start: 2023-01-09

## 2023-02-02 RX ORDER — TECHNETIUM TC-99M SESTAMIBI 1 MG/10ML
33.2 INJECTION INTRAVENOUS
Status: COMPLETED | OUTPATIENT
Start: 2023-02-02 | End: 2023-02-02

## 2023-02-02 RX ORDER — TECHNETIUM TC-99M SESTAMIBI 1 MG/10ML
9.6 INJECTION INTRAVENOUS
Status: COMPLETED | OUTPATIENT
Start: 2023-02-02 | End: 2023-02-02

## 2023-02-02 RX ADMIN — REGADENOSON 0.4 MG: 0.08 INJECTION, SOLUTION INTRAVENOUS at 08:43

## 2023-02-02 RX ADMIN — SODIUM CHLORIDE, PRESERVATIVE FREE 10 ML: 5 INJECTION INTRAVENOUS at 08:44

## 2023-02-02 RX ADMIN — SODIUM CHLORIDE, PRESERVATIVE FREE 10 ML: 5 INJECTION INTRAVENOUS at 08:43

## 2023-02-02 RX ADMIN — SODIUM CHLORIDE, PRESERVATIVE FREE 10 ML: 5 INJECTION INTRAVENOUS at 07:45

## 2023-02-02 RX ADMIN — Medication 33.2 MILLICURIE: at 08:44

## 2023-02-02 RX ADMIN — Medication 9.6 MILLICURIE: at 07:44

## 2023-02-02 NOTE — PROCEDURES
[address] [facility] [address] [facility]   [address] [facility] [address], [address] [facility]  [phone]                Pharmacologic Stress Nuclear Gated SPECT Study    Name: [address] [place] [address] Account Number: [de-identified]      :  1945          Sex: female         Date of Study:  2023    Height: 5' 4\" (162.6 cm)         Weight: 155 lb (70.3 kg)     Ordering Provider: Bolivar Jacobsen MD          PCP: Rebeca White MD      Cardiologist: Bolivar Jacobsen MD             Interpreting Physician: Alex Fisher MD  _________________________________________________________________________________    Indication:   Detecting the presence and location of coronary artery disease    Clinical History:   Patient has no known history of coronary artery disease. Resting ECG:    Normal sinus rhythm, normal EKG. Procedure:   Pharmacologic stress testing was performed with regadenoson 0.4 mg for 15 seconds. The heart rate was 59 at baseline and mike to 88 beats during the infusion. This corresponds to 61% of maximum predicted heart rate. The blood pressure at baseline was 114/68 and blood pressure at the end of infusion was 138/76. Blood pressure response was normal during the stress procedure. The patient experienced mild shortness of breath and abdominal cramping during the infusion that resolved in recovery. ECG during the infusion did not change. IMAGING: Myocardial perfusion imaging was performed at rest 30-35 minutes following the intravenous injection of 9.6 mCi of (Tc-Sestamibi) followed by 10 ml of Normal Saline. As per infusion protocol, the patient was injected intravenously with 33.2 mCi of (Tc-Sestamibi) followed by 10 ml of Normal Saline. Gated post-stress tomographic imaging was performed 20-25 minutes after stress. FINDINGS: The overall quality of the study was fair.      Left ventricular cavity size was noted to be normal.    Rotational analog analysis demonstrated no patient motion or abnormal extracardiac radioactivity and there is soft tissue breast attenuation. There is also increased subdiaphragmatic radiotracer activity. The gated SPECT stress imaging in the short, vertical long, and horizontal long axis demonstrated abnormal tracer distribution in the myocardium. A mild defect was present in the mid anterior wall(s) that was  small sized by quantification with normal wall motion and thickening. The resting images show no change. Gated SPECT left ventricular ejection fraction was calculated to be 66%, with normal myocardial thickening and wall motion. TID ratio 1.09    Impression:    ECG during the infusion did not change. The myocardial perfusion imaging was normal with attenuation artifact. Overall left ventricular systolic function was normal without regional wall motion abnormalities. No transient ischemic dilatation. Low risk general pharmacologic stress test.    Thank you for sending your patient to this [employer].      Electronically signed by Blanche Fitzgerald MD on 2/2/23 at 3:41 PM EST

## 2023-02-02 NOTE — DISCHARGE INSTRUCTIONS
[id number] [facility]      Instructions to Patients    The following are the instructions for patients who have had a procedure in our office today. Patient name: Allison Lemus    Radionuclide Activity: 40mCi of 99mTc-Sestamibi    Date Administered: 2/2/2023    Expires: 48 hours after scheduled appointment time      Patient may resume normal activity unless otherwise instructed. Patient may resume medications as normal.  If the need should arise, patient may call [phone] between the hours of 8:00am-5:00pm.  After hours there is at least one physician on-call at all times for those patients needing assistance. Patients may call [phone] and the answering service will direct the patient to one of our physicians for assistance. After the patient's test if they are going to be leaving from an airport in the near future they should take this letter with them to verify the test and radionuclide used for their test.      This letter verifies that the above named bearer received an injection of a radionuclide for medical purpose/usage only.         Electronically signed by Sulma Lozada on 2/2/2023 at 8:43 AM

## 2023-02-06 ENCOUNTER — TELEPHONE (OUTPATIENT)
Dept: CARDIOLOGY CLINIC | Age: 78
End: 2023-02-06

## 2023-02-06 NOTE — TELEPHONE ENCOUNTER
----- Message from Marcie Staley MD sent at 2/2/2023  4:14 PM EST -----  Stress normal  Ov prn      ----- Message -----  From: Rebecca Webber MD  Sent: 2/2/2023   3:47 PM EST  To: Marcie Staley MD

## 2023-03-03 ENCOUNTER — HOSPITAL ENCOUNTER (OUTPATIENT)
Age: 78
Discharge: HOME | End: 2023-03-05

## 2023-03-03 LAB
ABO/RH: NORMAL
ANTIBODY SCREEN: NORMAL

## 2023-03-03 PROCEDURE — 86850 RBC ANTIBODY SCREEN: CPT

## 2023-03-03 PROCEDURE — 86901 BLOOD TYPING SEROLOGIC RH(D): CPT

## 2023-03-03 PROCEDURE — 86900 BLOOD TYPING SEROLOGIC ABO: CPT

## 2023-03-14 ENCOUNTER — HOSPITAL ENCOUNTER (OUTPATIENT)
Age: 78
Discharge: HOME | End: 2023-03-16

## 2023-03-14 LAB
ANION GAP SERPL CALCULATED.3IONS-SCNC: 9 MMOL/L (ref 7–16)
BUN BLDV-MCNC: 12 MG/DL (ref 6–23)
CALCIUM SERPL-MCNC: 8.8 MG/DL (ref 8.6–10.2)
CHLORIDE BLD-SCNC: 97 MMOL/L (ref 98–107)
CO2: 27 MMOL/L (ref 22–29)
CREAT SERPL-MCNC: 0.7 MG/DL (ref 0.5–1)
GFR SERPL CREATININE-BSD FRML MDRD: >60 ML/MIN/1.73
GLUCOSE BLD-MCNC: 117 MG/DL (ref 74–99)
HCT VFR BLD CALC: 28.2 % (ref 34–48)
HEMOGLOBIN: 9.2 G/DL (ref 11.5–15.5)
MCH RBC QN AUTO: 31.2 PG (ref 26–35)
MCHC RBC AUTO-ENTMCNC: 32.6 % (ref 32–34.5)
MCV RBC AUTO: 95.6 FL (ref 80–99.9)
PDW BLD-RTO: 14 FL (ref 11.5–15)
PLATELET # BLD: 169 E9/L (ref 130–450)
PMV BLD AUTO: 12.3 FL (ref 7–12)
POTASSIUM SERPL-SCNC: 4.1 MMOL/L (ref 3.5–5)
RBC # BLD: 2.95 E12/L (ref 3.5–5.5)
SODIUM BLD-SCNC: 133 MMOL/L (ref 132–146)
WBC # BLD: 13 E9/L (ref 4.5–11.5)

## 2023-03-14 PROCEDURE — 85027 COMPLETE CBC AUTOMATED: CPT

## 2023-03-14 PROCEDURE — 80048 BASIC METABOLIC PNL TOTAL CA: CPT

## 2023-03-27 ENCOUNTER — APPOINTMENT (OUTPATIENT)
Dept: CT IMAGING | Age: 78
End: 2023-03-27
Payer: MEDICARE

## 2023-03-27 ENCOUNTER — HOSPITAL ENCOUNTER (EMERGENCY)
Age: 78
Discharge: HOME | End: 2023-03-27
Attending: EMERGENCY MEDICINE
Payer: MEDICARE

## 2023-03-27 ENCOUNTER — APPOINTMENT (OUTPATIENT)
Dept: GENERAL RADIOLOGY | Age: 78
End: 2023-03-27
Payer: MEDICARE

## 2023-03-27 VITALS
DIASTOLIC BLOOD PRESSURE: 72 MMHG | RESPIRATION RATE: 14 BRPM | SYSTOLIC BLOOD PRESSURE: 168 MMHG | WEIGHT: 148 LBS | BODY MASS INDEX: 25.27 KG/M2 | HEIGHT: 64 IN | TEMPERATURE: 98 F | HEART RATE: 78 BPM | OXYGEN SATURATION: 99 %

## 2023-03-27 DIAGNOSIS — R07.9 CHEST PAIN, UNSPECIFIED TYPE: Primary | ICD-10-CM

## 2023-03-27 DIAGNOSIS — F41.1 ANXIETY STATE: ICD-10-CM

## 2023-03-27 LAB
ALBUMIN SERPL-MCNC: 4.3 G/DL (ref 3.5–5.2)
ALP SERPL-CCNC: 120 U/L (ref 35–104)
ALT SERPL-CCNC: 30 U/L (ref 0–32)
ANION GAP SERPL CALCULATED.3IONS-SCNC: 12 MMOL/L (ref 7–16)
AST SERPL-CCNC: 20 U/L (ref 0–31)
BACTERIA URNS QL MICRO: NORMAL /HPF
BASOPHILS # BLD: 0.04 E9/L (ref 0–0.2)
BASOPHILS NFR BLD: 0.5 % (ref 0–2)
BILIRUB SERPL-MCNC: 0.7 MG/DL (ref 0–1.2)
BILIRUB UR QL STRIP: NEGATIVE
BNP BLD-MCNC: 198 PG/ML (ref 0–450)
BUN SERPL-MCNC: 19 MG/DL (ref 6–23)
CALCIUM SERPL-MCNC: 10 MG/DL (ref 8.6–10.2)
CHLORIDE SERPL-SCNC: 101 MMOL/L (ref 98–107)
CLARITY UR: CLEAR
CO2 SERPL-SCNC: 25 MMOL/L (ref 22–29)
COLOR UR: YELLOW
CREAT SERPL-MCNC: 0.8 MG/DL (ref 0.5–1)
D DIMER: 3943 NG/ML DDU
EOSINOPHIL # BLD: 0.15 E9/L (ref 0.05–0.5)
EOSINOPHIL NFR BLD: 1.7 % (ref 0–6)
ERYTHROCYTE [DISTWIDTH] IN BLOOD BY AUTOMATED COUNT: 15.2 FL (ref 11.5–15)
GLUCOSE SERPL-MCNC: 94 MG/DL (ref 74–99)
GLUCOSE UR STRIP-MCNC: NEGATIVE MG/DL
HCT VFR BLD AUTO: 35.4 % (ref 34–48)
HGB BLD-MCNC: 11.3 G/DL (ref 11.5–15.5)
HGB UR QL STRIP: NEGATIVE
IMM GRANULOCYTES # BLD: 0.06 E9/L
IMM GRANULOCYTES NFR BLD: 0.7 % (ref 0–5)
KETONES UR STRIP-MCNC: NEGATIVE MG/DL
LEUKOCYTE ESTERASE UR QL STRIP: NEGATIVE
LYMPHOCYTES # BLD: 1.34 E9/L (ref 1.5–4)
LYMPHOCYTES NFR BLD: 15.3 % (ref 20–42)
MCH RBC QN AUTO: 30.4 PG (ref 26–35)
MCHC RBC AUTO-ENTMCNC: 31.9 % (ref 32–34.5)
MCV RBC AUTO: 95.2 FL (ref 80–99.9)
MONOCYTES # BLD: 0.61 E9/L (ref 0.1–0.95)
MONOCYTES NFR BLD: 7 % (ref 2–12)
NEUTROPHILS # BLD: 6.57 E9/L (ref 1.8–7.3)
NEUTS SEG NFR BLD: 74.8 % (ref 43–80)
NITRITE UR QL STRIP: NEGATIVE
PH UR STRIP: 6 [PH] (ref 5–9)
PLATELET # BLD AUTO: 469 E9/L (ref 130–450)
PMV BLD AUTO: 10.1 FL (ref 7–12)
POTASSIUM SERPL-SCNC: 4 MMOL/L (ref 3.5–5)
PROT SERPL-MCNC: 7.6 G/DL (ref 6.4–8.3)
PROT UR STRIP-MCNC: NEGATIVE MG/DL
RBC # BLD AUTO: 3.72 E12/L (ref 3.5–5.5)
RBC #/AREA URNS HPF: NORMAL /HPF (ref 0–2)
SARS-COV-2 RDRP RESP QL NAA+PROBE: NOT DETECTED
SODIUM SERPL-SCNC: 138 MMOL/L (ref 132–146)
SP GR UR STRIP: <=1.005 (ref 1–1.03)
TROPONIN, HIGH SENSITIVITY: 8 NG/L (ref 0–9)
UROBILINOGEN UR STRIP-ACNC: 0.2 E.U./DL
WBC # BLD: 8.8 E9/L (ref 4.5–11.5)
WBC #/AREA URNS HPF: NORMAL /HPF (ref 0–5)

## 2023-03-27 PROCEDURE — 93005 ELECTROCARDIOGRAM TRACING: CPT | Performed by: EMERGENCY MEDICINE

## 2023-03-27 PROCEDURE — 84484 ASSAY OF TROPONIN QUANT: CPT

## 2023-03-27 PROCEDURE — 2580000003 HC RX 258: Performed by: STUDENT IN AN ORGANIZED HEALTH CARE EDUCATION/TRAINING PROGRAM

## 2023-03-27 PROCEDURE — 96376 TX/PRO/DX INJ SAME DRUG ADON: CPT

## 2023-03-27 PROCEDURE — 85378 FIBRIN DEGRADE SEMIQUANT: CPT

## 2023-03-27 PROCEDURE — 71045 X-RAY EXAM CHEST 1 VIEW: CPT

## 2023-03-27 PROCEDURE — 96374 THER/PROPH/DIAG INJ IV PUSH: CPT

## 2023-03-27 PROCEDURE — 99285 EMERGENCY DEPT VISIT HI MDM: CPT

## 2023-03-27 PROCEDURE — 87635 SARS-COV-2 COVID-19 AMP PRB: CPT

## 2023-03-27 PROCEDURE — 71260 CT THORAX DX C+: CPT | Performed by: EMERGENCY MEDICINE

## 2023-03-27 PROCEDURE — 80053 COMPREHEN METABOLIC PANEL: CPT

## 2023-03-27 PROCEDURE — 6360000004 HC RX CONTRAST MEDICATION: Performed by: STUDENT IN AN ORGANIZED HEALTH CARE EDUCATION/TRAINING PROGRAM

## 2023-03-27 PROCEDURE — 81001 URINALYSIS AUTO W/SCOPE: CPT

## 2023-03-27 PROCEDURE — 83880 ASSAY OF NATRIURETIC PEPTIDE: CPT

## 2023-03-27 PROCEDURE — 6360000002 HC RX W HCPCS: Performed by: EMERGENCY MEDICINE

## 2023-03-27 PROCEDURE — 85025 COMPLETE CBC W/AUTO DIFF WBC: CPT

## 2023-03-27 PROCEDURE — 36415 COLL VENOUS BLD VENIPUNCTURE: CPT

## 2023-03-27 RX ORDER — LORAZEPAM 0.5 MG/1
0.5 TABLET ORAL EVERY 6 HOURS PRN
Qty: 30 TABLET | Refills: 0 | Status: SHIPPED | OUTPATIENT
Start: 2023-03-27 | End: 2023-04-11

## 2023-03-27 RX ORDER — SODIUM CHLORIDE 0.9 % (FLUSH) 0.9 %
10 SYRINGE (ML) INJECTION PRN
Status: COMPLETED | OUTPATIENT
Start: 2023-03-27 | End: 2023-03-27

## 2023-03-27 RX ORDER — LORAZEPAM 2 MG/ML
0.5 INJECTION INTRAMUSCULAR EVERY 6 HOURS PRN
Status: DISCONTINUED | OUTPATIENT
Start: 2023-03-27 | End: 2023-03-27 | Stop reason: HOSPADM

## 2023-03-27 RX ADMIN — SODIUM CHLORIDE, PRESERVATIVE FREE 10 ML: 5 INJECTION INTRAVENOUS at 19:16

## 2023-03-27 RX ADMIN — IOPAMIDOL 75 ML: 755 INJECTION, SOLUTION INTRAVENOUS at 19:16

## 2023-03-27 RX ADMIN — LORAZEPAM 0.5 MG: 2 INJECTION INTRAMUSCULAR; INTRAVENOUS at 16:02

## 2023-03-27 RX ADMIN — LORAZEPAM 0.5 MG: 2 INJECTION INTRAMUSCULAR; INTRAVENOUS at 19:47

## 2023-03-27 ASSESSMENT — PAIN SCALES - GENERAL
PAINLEVEL_OUTOF10: 3

## 2023-03-27 ASSESSMENT — LIFESTYLE VARIABLES: HOW OFTEN DO YOU HAVE A DRINK CONTAINING ALCOHOL: NEVER

## 2023-03-27 ASSESSMENT — PAIN DESCRIPTION - LOCATION: LOCATION: KNEE

## 2023-03-27 ASSESSMENT — PAIN - FUNCTIONAL ASSESSMENT
PAIN_FUNCTIONAL_ASSESSMENT: 0-10
PAIN_FUNCTIONAL_ASSESSMENT: 0-10

## 2023-03-28 LAB
EKG ATRIAL RATE: 68 BPM
EKG P AXIS: 70 DEGREES
EKG P-R INTERVAL: 128 MS
EKG Q-T INTERVAL: 416 MS
EKG QRS DURATION: 80 MS
EKG QTC CALCULATION (BAZETT): 442 MS
EKG R AXIS: 53 DEGREES
EKG T AXIS: 61 DEGREES
EKG VENTRICULAR RATE: 68 BPM

## 2023-03-28 PROCEDURE — 93010 ELECTROCARDIOGRAM REPORT: CPT | Performed by: INTERNAL MEDICINE

## 2023-03-28 NOTE — ED PROVIDER NOTES
Patient signed out with anxiety/chest pain. Patient Remedios Span was negative per outgoing physician. Remedios Span was 8. EKG unremarkable. CTA done negative for PE. Patient better with Ativan. Will DC with outpatient follow-up.      Carissa Kent MD  03/27/23 2024
Vitals:    03/27/23 1505 03/27/23 1603 03/27/23 1933 03/27/23 2032   BP:  134/66 (!) 174/76 (!) 168/72   Pulse:  68 76 78   Resp:  16 14 14   Temp:    98 °F (36.7 °C)   TempSrc:    Axillary   SpO2:  97% 98% 99%   Weight: 148 lb (67.1 kg)      Height: 5' 4\" (1.626 m)          1)  Number and Complexity of Problems    Chief Complaint--Problem List This Visit: Anxiety, tremulous    HPI 27-year-old patient status post right total knee replacement proxy 3 weeks prior presents to the emergency department with concerns of increasing anxiety without associated chest pain without specific shortness of breath. There is been no documented fever no increased frequency of urination. The patient is not sure if she took her antianxiety medications today.     Per previous external records    Pertinent past medical history     Differential Diagnosis: Pulmonary embolism, congestive heart failure, electrolyte imbalance, anxiety, UTI    Diagnoses Considered but Do Not Suspect:      Pertinent Comorbid Conditions:      2) Focused physical examination   -----------------------------------------------    Vital signs blood pressure 1 634/61-temperature is 97.8-pulse 84 and regular-pulse oximeter 98% and regular    General patient is alert she appears to be slightly anxious however nontoxic appearing no evidence of a peripheral central cyanosis    HEENT examination no photophobia appreciated mucous membranes slightly dry    Neck trachea is midline without JVD    Respiratory slight decreased breath sounds left lower lung base, cardiovascular regular rhythm without murmur consistent with aortic insufficiency    Abdomen bowel sounds present soft without appreciable tenderness extremities right knee status post total knee replacement incision without any drainage incisional sites are well approximated calfs are without tenderness    3)  Data Reviewed  My EKG interpretation: Performed at 1600-a ventricular rate 68, MA interval 0.128, QRS

## 2023-04-04 PROBLEM — F10.10 ALCOHOL ABUSE: Status: RESOLVED | Noted: 2021-10-05 | Resolved: 2023-04-04

## 2023-04-04 PROBLEM — R05.3 CHRONIC COUGH: Status: RESOLVED | Noted: 2022-12-02 | Resolved: 2023-04-04

## 2023-04-04 PROBLEM — K57.32 DIVERTICULITIS OF COLON: Status: RESOLVED | Noted: 2018-07-13 | Resolved: 2023-04-04

## 2023-05-05 DIAGNOSIS — E78.00 PURE HYPERCHOLESTEROLEMIA: ICD-10-CM

## 2023-05-05 DIAGNOSIS — R53.82 CHRONIC FATIGUE: ICD-10-CM

## 2023-05-05 LAB
ALBUMIN SERPL-MCNC: 4.5 G/DL (ref 3.5–5.2)
ALP SERPL-CCNC: 85 U/L (ref 35–104)
ALT SERPL-CCNC: 17 U/L (ref 0–32)
ANION GAP SERPL CALCULATED.3IONS-SCNC: 11 MMOL/L (ref 7–16)
AST SERPL-CCNC: 20 U/L (ref 0–31)
BASOPHILS # BLD: 0.03 E9/L (ref 0–0.2)
BASOPHILS NFR BLD: 0.6 % (ref 0–2)
BILIRUB SERPL-MCNC: 0.4 MG/DL (ref 0–1.2)
BUN SERPL-MCNC: 11 MG/DL (ref 6–23)
CALCIUM SERPL-MCNC: 9.6 MG/DL (ref 8.6–10.2)
CHLORIDE SERPL-SCNC: 107 MMOL/L (ref 98–107)
CHOLESTEROL, TOTAL: 127 MG/DL (ref 0–199)
CO2 SERPL-SCNC: 24 MMOL/L (ref 22–29)
CREAT SERPL-MCNC: 0.8 MG/DL (ref 0.5–1)
EOSINOPHIL # BLD: 0.29 E9/L (ref 0.05–0.5)
EOSINOPHIL NFR BLD: 5.5 % (ref 0–6)
ERYTHROCYTE [DISTWIDTH] IN BLOOD BY AUTOMATED COUNT: 14.6 FL (ref 11.5–15)
GLUCOSE SERPL-MCNC: 105 MG/DL (ref 74–99)
HCT VFR BLD AUTO: 42.3 % (ref 34–48)
HDLC SERPL-MCNC: 45 MG/DL
HGB BLD-MCNC: 13.2 G/DL (ref 11.5–15.5)
IMM GRANULOCYTES # BLD: 0.01 E9/L
IMM GRANULOCYTES NFR BLD: 0.2 % (ref 0–5)
LDLC SERPL CALC-MCNC: 50 MG/DL (ref 0–99)
LYMPHOCYTES # BLD: 1.5 E9/L (ref 1.5–4)
LYMPHOCYTES NFR BLD: 28.6 % (ref 20–42)
MCH RBC QN AUTO: 30.4 PG (ref 26–35)
MCHC RBC AUTO-ENTMCNC: 31.2 % (ref 32–34.5)
MCV RBC AUTO: 97.5 FL (ref 80–99.9)
MONOCYTES # BLD: 0.54 E9/L (ref 0.1–0.95)
MONOCYTES NFR BLD: 10.3 % (ref 2–12)
NEUTROPHILS # BLD: 2.87 E9/L (ref 1.8–7.3)
NEUTS SEG NFR BLD: 54.8 % (ref 43–80)
PLATELET # BLD AUTO: 248 E9/L (ref 130–450)
PMV BLD AUTO: 12.2 FL (ref 7–12)
POTASSIUM SERPL-SCNC: 4.4 MMOL/L (ref 3.5–5)
PROT SERPL-MCNC: 7.3 G/DL (ref 6.4–8.3)
RBC # BLD AUTO: 4.34 E12/L (ref 3.5–5.5)
SODIUM SERPL-SCNC: 142 MMOL/L (ref 132–146)
TRIGL SERPL-MCNC: 159 MG/DL (ref 0–149)
TSH SERPL-MCNC: 1.81 UIU/ML (ref 0.27–4.2)
VIT B12 SERPL-MCNC: 686 PG/ML (ref 211–946)
VLDLC SERPL CALC-MCNC: 32 MG/DL
WBC # BLD: 5.2 E9/L (ref 4.5–11.5)

## 2023-05-06 LAB — ERYTHROCYTE [SEDIMENTATION RATE] IN BLOOD BY WESTERGREN METHOD: 32 MM/HR (ref 0–20)

## 2023-05-08 ENCOUNTER — HOSPITAL ENCOUNTER (OUTPATIENT)
Dept: GENERAL RADIOLOGY | Age: 78
Discharge: HOME | End: 2023-05-10
Payer: MEDICARE

## 2023-05-08 ENCOUNTER — HOSPITAL ENCOUNTER (OUTPATIENT)
Age: 78
Discharge: HOME | End: 2023-05-10
Payer: MEDICARE

## 2023-05-08 ENCOUNTER — HOSPITAL ENCOUNTER (OUTPATIENT)
Age: 78
Discharge: HOME | End: 2023-05-08
Payer: MEDICARE

## 2023-05-08 DIAGNOSIS — R70.0 ELEVATED SED RATE: ICD-10-CM

## 2023-05-08 DIAGNOSIS — M54.50 ACUTE MIDLINE LOW BACK PAIN WITHOUT SCIATICA: ICD-10-CM

## 2023-05-08 LAB
ERYTHROCYTE [SEDIMENTATION RATE] IN BLOOD BY WESTERGREN METHOD: 16 MM/HR (ref 0–20)
PROCALCITONIN: 0.05 NG/ML (ref 0–0.08)

## 2023-05-08 PROCEDURE — 36415 COLL VENOUS BLD VENIPUNCTURE: CPT

## 2023-05-08 PROCEDURE — 72100 X-RAY EXAM L-S SPINE 2/3 VWS: CPT

## 2023-05-08 PROCEDURE — 85651 RBC SED RATE NONAUTOMATED: CPT

## 2023-05-08 PROCEDURE — 84145 PROCALCITONIN (PCT): CPT

## 2023-05-23 ENCOUNTER — HOSPITAL ENCOUNTER (OUTPATIENT)
Dept: SLEEP CENTER | Age: 78
Discharge: HOME | End: 2023-05-23
Payer: MEDICARE

## 2023-05-23 DIAGNOSIS — R00.2 PALPITATIONS: ICD-10-CM

## 2023-05-23 PROCEDURE — 93246 EXT ECG>7D<15D RECORDING: CPT

## 2023-07-31 ENCOUNTER — HOSPITAL ENCOUNTER (OUTPATIENT)
Dept: GENERAL RADIOLOGY | Age: 78
Discharge: HOME | End: 2023-08-02
Attending: INTERNAL MEDICINE
Payer: MEDICARE

## 2023-07-31 VITALS — BODY MASS INDEX: 24.41 KG/M2 | WEIGHT: 143 LBS | HEIGHT: 64 IN

## 2023-07-31 DIAGNOSIS — Z12.31 SCREENING MAMMOGRAM, ENCOUNTER FOR: ICD-10-CM

## 2023-07-31 PROCEDURE — 77063 BREAST TOMOSYNTHESIS BI: CPT

## 2023-09-21 ENCOUNTER — HOSPITAL ENCOUNTER (OUTPATIENT)
Dept: NEUROLOGY | Age: 78
Discharge: HOME | End: 2023-09-21
Payer: MEDICARE

## 2023-09-21 ENCOUNTER — TELEPHONE (OUTPATIENT)
Dept: ORTHOPEDIC SURGERY | Age: 78
End: 2023-09-21

## 2023-09-21 VITALS — WEIGHT: 148 LBS | BODY MASS INDEX: 25.27 KG/M2 | HEIGHT: 64 IN

## 2023-09-21 DIAGNOSIS — R20.0 NUMBNESS AND TINGLING OF BOTH FEET: ICD-10-CM

## 2023-09-21 DIAGNOSIS — R20.2 NUMBNESS AND TINGLING OF BOTH FEET: ICD-10-CM

## 2023-09-21 PROCEDURE — 95911 NRV CNDJ TEST 9-10 STUDIES: CPT

## 2023-09-21 PROCEDURE — 95886 MUSC TEST DONE W/N TEST COMP: CPT

## 2023-09-21 PROCEDURE — 95911 NRV CNDJ TEST 9-10 STUDIES: CPT | Performed by: PSYCHIATRY & NEUROLOGY

## 2023-09-21 PROCEDURE — 95886 MUSC TEST DONE W/N TEST COMP: CPT | Performed by: PSYCHIATRY & NEUROLOGY

## 2023-09-21 NOTE — TELEPHONE ENCOUNTER
Patient called and left a voicemail stating she is requesting an appt with a back surgeon. I attempted to call patient and left a voicemail to give the office a call.

## 2024-01-02 ENCOUNTER — OFFICE VISIT (OUTPATIENT)
Dept: PRIMARY CARE CLINIC | Age: 79
End: 2024-01-02
Payer: MEDICARE

## 2024-01-02 VITALS
WEIGHT: 145 LBS | SYSTOLIC BLOOD PRESSURE: 128 MMHG | BODY MASS INDEX: 24.75 KG/M2 | HEIGHT: 64 IN | OXYGEN SATURATION: 99 % | HEART RATE: 76 BPM | DIASTOLIC BLOOD PRESSURE: 76 MMHG | TEMPERATURE: 98.3 F

## 2024-01-02 DIAGNOSIS — J02.9 PHARYNGITIS, UNSPECIFIED ETIOLOGY: ICD-10-CM

## 2024-01-02 DIAGNOSIS — R05.9 COUGH, UNSPECIFIED TYPE: Primary | ICD-10-CM

## 2024-01-02 DIAGNOSIS — R06.02 SOB (SHORTNESS OF BREATH): ICD-10-CM

## 2024-01-02 DIAGNOSIS — J01.90 ACUTE SINUSITIS, RECURRENCE NOT SPECIFIED, UNSPECIFIED LOCATION: ICD-10-CM

## 2024-01-02 LAB
INFLUENZA A ANTIGEN, POC: NEGATIVE
INFLUENZA B ANTIGEN, POC: NEGATIVE
LOT EXPIRE DATE: NORMAL
LOT KIT NUMBER: NORMAL
SARS-COV-2, POC: NORMAL
VALID INTERNAL CONTROL: POSITIVE
VENDOR AND KIT NAME POC: NORMAL

## 2024-01-02 PROCEDURE — 87428 SARSCOV & INF VIR A&B AG IA: CPT | Performed by: FAMILY MEDICINE

## 2024-01-02 PROCEDURE — 1123F ACP DISCUSS/DSCN MKR DOCD: CPT | Performed by: FAMILY MEDICINE

## 2024-01-02 PROCEDURE — 99213 OFFICE O/P EST LOW 20 MIN: CPT | Performed by: FAMILY MEDICINE

## 2024-01-02 RX ORDER — ALUMINUM ZIRCONIUM OCTACHLOROHYDREX GLY 16 G/100G
1 GEL TOPICAL DAILY
Qty: 14 CAPSULE | Refills: 0 | Status: SHIPPED | OUTPATIENT
Start: 2024-01-02 | End: 2024-01-16

## 2024-01-02 RX ORDER — AMOXICILLIN AND CLAVULANATE POTASSIUM 875; 125 MG/1; MG/1
1 TABLET, FILM COATED ORAL 2 TIMES DAILY
Qty: 20 TABLET | Refills: 0 | Status: SHIPPED | OUTPATIENT
Start: 2024-01-02 | End: 2024-01-12

## 2024-01-02 RX ORDER — PREDNISONE 20 MG/1
40 TABLET ORAL DAILY
Qty: 10 TABLET | Refills: 0 | Status: SHIPPED | OUTPATIENT
Start: 2024-01-02 | End: 2024-01-07

## 2024-01-02 NOTE — PROGRESS NOTES
FM Progress Note    Subjective:   Cough. Subjective fever. Some sob. Sinus congestion. Started a week ago.     Nonsmoker.       Health Maintenance Due   Topic Date Due    Pneumococcal 65+ years Vaccine (2 - PPSV23 or PCV20) 09/30/2016    COVID-19 Vaccine (3 - 2023-24 season) 09/01/2023    Annual Wellness Visit (Medicare Advantage)  01/01/2024         Objective:   /76   Pulse 76   Temp 98.3 °F (36.8 °C)   Ht 1.626 m (5' 4\")   Wt 65.8 kg (145 lb)   LMP  (LMP Unknown)   SpO2 99%   BMI 24.89 kg/m²   General appearance: NAD, alert and interacting appropriately  HEENT: NCAT, PERRLA, EOMI. Sinus ttp.   Resp: CTAB, no WRC  CVS: RRR, no MRG  Abdomen: BS +, SNDNT        I have reviewed this patient's previous records.    I have reviewed this patient's labs.    I have reviewed this patient's imaging reports.    I have reviewed this patient's medications.      Assessment/Plan:    Leah was seen today for cough, pharyngitis and congestion.    Diagnoses and all orders for this visit:    Cough, unspecified type  -     POCT COVID-19 & Influenza A/B    Pharyngitis, unspecified etiology  -     POCT COVID-19 & Influenza A/B    Acute sinusitis, recurrence not specified, unspecified location    SOB (shortness of breath)    Rtc one wk if not improved.         There are no Patient Instructions on file for this visit.     No follow-ups on file.      Electronically signed by Lazaro Brar MD on 1/2/2024 at 2:46 PM

## 2024-01-08 ENCOUNTER — HOSPITAL ENCOUNTER (OUTPATIENT)
Dept: PSYCHIATRY | Age: 79
Setting detail: THERAPIES SERIES
Discharge: HOME | End: 2024-01-08
Payer: MEDICARE

## 2024-01-08 DIAGNOSIS — F41.9 ANXIETY DISORDER, UNSPECIFIED TYPE: Primary | ICD-10-CM

## 2024-01-08 PROCEDURE — 90792 PSYCH DIAG EVAL W/MED SRVCS: CPT | Performed by: PSYCHIATRY & NEUROLOGY

## 2024-01-08 RX ORDER — CLONAZEPAM 1 MG/1
1 TABLET ORAL 2 TIMES DAILY PRN
Qty: 60 TABLET | Refills: 0 | Status: SHIPPED | OUTPATIENT
Start: 2024-01-08 | End: 2024-02-07

## 2024-01-08 NOTE — H&P
PSYCHIATRIC EVALUATION      CHIEF COMPLAINT:  \"I just need this anxiety to go away.\"    HISTORY OF PRESENT ILLNESS: Leah Dorado is a 78 y.o. female with history of treatment for depression/anxiety who presents for psychiatric evaluation at outpatient clinic for possible TMS as a referral from Dr. Velasquez. Patient cooperative and provides good history.  also present and corroborative.     Current regimen consists of Lexapro 20 mg daily, Trazodone 50 mg nightly, Wellbutrin  mg daily, Ativan 0.5 mg every 6 hours prn  Patient reports Dr lowered Lexapro to 10 mg, but did not go well and put her back to 20 mg  Patient reports her depression has been well-controlled for a long time, denies recent depression  Does complain of high anxiety recently - difficulty relaxing, can't concentrate, GI symptoms   reports patient has been shaky and clingy which is unlike her  He reports she had similar episode about a year ago when she had to have surgery  Patient acknowledges recent stress with having URI and also worrying about 's upcoming surgery  Normally she does not take the lorazepam but has been using recently; unfortunately getting just little relief   Patient is not suicidal, homicidal, manic or psychotic  Sleeping well but having some issues with appetite due to recent nausea    PAST PSYCHIATRIC HISTORY: No other medication trials. No current psychiatrist or therapist. No psychiatric hospitalizations or suicide attempts.    PAST MEDICAL HISTORY:       Diagnosis Date    Anxiety     Arthritis     Chronic headaches     Depression     Diverticulitis of colon 7/13/2018    Fatigue     Fibromyalgia     GERD (gastroesophageal reflux disease)     History of blood transfusion     Hyperlipidemia     Nausea & vomiting     Von Willebrand disease (HCC)      ALLERGIES: Nickel and Statins    FAMILY PSYCHIATRIC HISTORY:  Noncontributory.    SOCIAL HISTORY: Patient is  and lives with

## 2024-01-22 ENCOUNTER — HOSPITAL ENCOUNTER (OUTPATIENT)
Dept: PSYCHIATRY | Age: 79
Setting detail: THERAPIES SERIES
Discharge: HOME | End: 2024-01-22

## 2024-01-22 NOTE — PROGRESS NOTES
PSYCHIATRY ATTENDING NOTE    CC: \"I am probably depressed.\"     S: Patient being seen at outpatient clinic in follow-up for depression and anxiety. Met with patient to discuss progress with treatment and revisit alternative treatments for depression. Spoke with Dr. Velasquez and he feels patient's depression is not as controlled as she initially suggested. Patient now acknowledges this to be true and endorses symptoms of dysphoria, anhedonia, anergia, diminished motivation, poor concentration, anorexia and tearfulness. She still has a lot of anxious distress and describes being constantly on the cusp of panic. She has had some relief with the Klonopin but has been hesitant to use it much. She is sleeping OK with Trazodone.  just had hip surgery and patient has been more isolated at home taking care of him. Reviewed previous medication trials of Paxil, Zoloft and Cymbalta. No acute safety concerns.     MSE: White female appears age. Pleasant, cooperative, forthcoming. Normal psychomotor activity, gait, strength, tone, eye contact. Mood dysphoric. Affect congruent. Speech clear. Thought process organized. Content future-oriented. Focused on anxiety symptoms. No suicidal or homicidal ideations. No paranoia, delusions, hallucinations. Orientation, concentration, recent and remote memory are grossly intact. Fund of knowledge fair. Language use fair. Insight and judgment fair.      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

## 2024-01-29 ENCOUNTER — HOSPITAL ENCOUNTER (OUTPATIENT)
Dept: PSYCHIATRY | Age: 79
Setting detail: THERAPIES SERIES
Discharge: HOME | End: 2024-01-29
Payer: MEDICARE

## 2024-01-29 PROCEDURE — 90867 TCRANIAL MAGN STIM TX PLAN: CPT

## 2024-01-29 NOTE — PROGRESS NOTES
Transcranial Magnetic Stimulation Procedure    Name: Leah Dorado    Date: 1/29/2024    Treatment: 1/35    Procedure Performed: Transcranial Magnetic Stimulation (TMS)    Procedure Detail: The optimal treatment parameters as determined during the initial Motor Threshold TMS device. The patient was positioned in the TMS treatment chair. The Magstim TMS treatment coil was placed at the optimal treatment location as determined during the initial Motor Threshold and Treatment Location Determination Procedure, and the treatment parameters for the patient were confirmed. The Magstim TMS Treatment was delivered. Patient was monitored during the TMS treatment.    Subjective:  Dose at which the procedure was performed: 68  Patient's response before and after the procedure: Mapping and Initial treatment tolerated without issue, will notify staff of any side effects. Will titrate treatment until prescribed dose. Offered earplugs but patient declined. Will continue to offer supports as needed. 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   Patient to return on 1/30/2024      Signed: Ivania Granados LPN, 1/29/2024

## 2024-01-30 ENCOUNTER — HOSPITAL ENCOUNTER (OUTPATIENT)
Dept: PSYCHIATRY | Age: 79
Setting detail: THERAPIES SERIES
Discharge: HOME | End: 2024-01-30
Payer: MEDICARE

## 2024-01-30 PROCEDURE — 90868 TCRANIAL MAGN STIM TX DELI: CPT

## 2024-01-30 NOTE — PROGRESS NOTES
Transcranial Magnetic Stimulation Procedure    Name: Leah Dorado    Date: 1/30/2024    Treatment: 2/35    Procedure Performed: Transcranial Magnetic Stimulation (TMS)    Procedure Detail: The optimal treatment parameters as determined during the initial Motor Threshold TMS device. The patient was positioned in the TMS treatment chair. The Magstim TMS treatment coil was placed at the optimal treatment location as determined during the initial Motor Threshold and Treatment Location Determination Procedure, and the treatment parameters for the patient were confirmed. The Magstim TMS Treatment was delivered. Patient was monitored during the TMS treatment.    Subjective:  Dose at which the procedure was performed: 77  Patient's response before and after the procedure: Tolerated TMS treatment without issue, no side effects voiced from previous tx. No change in depression symptoms but optimistic about tx. 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   Patient to return on 1/31/2024      Signed: Ivania Granados LPN, 1/30/2024

## 2024-01-31 ENCOUNTER — HOSPITAL ENCOUNTER (OUTPATIENT)
Dept: PSYCHIATRY | Age: 79
Setting detail: THERAPIES SERIES
Discharge: HOME | End: 2024-01-31
Payer: MEDICARE

## 2024-01-31 PROCEDURE — 90868 TCRANIAL MAGN STIM TX DELI: CPT

## 2024-01-31 NOTE — PROGRESS NOTES
Transcranial Magnetic Stimulation Procedure    Name: Leah Dorado    Date: 1/31/2024    Treatment: 3/35    Procedure Performed: Transcranial Magnetic Stimulation (TMS)    Procedure Detail: The optimal treatment parameters as determined during the initial Motor Threshold TMS device. The patient was positioned in the TMS treatment chair. The Magstim TMS treatment coil was placed at the optimal treatment location as determined during the initial Motor Threshold and Treatment Location Determination Procedure, and the treatment parameters for the patient were confirmed. The Magstim TMS Treatment was delivered. Patient was monitored during the TMS treatment.    Subjective:  Dose at which the procedure was performed: 85  Patient's response before and after the procedure: Tolerating TMS treatment without issue, no headache or site tenderness voiced. Voicing falling asleep has been harder last couple of days. 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   Patient to return on 2/1/2024      Signed: Ivania Granados LPN, 1/31/2024

## 2024-02-01 ENCOUNTER — HOSPITAL ENCOUNTER (OUTPATIENT)
Dept: PSYCHIATRY | Age: 79
Setting detail: THERAPIES SERIES
Discharge: HOME | End: 2024-02-01
Payer: MEDICARE

## 2024-02-01 PROCEDURE — 90868 TCRANIAL MAGN STIM TX DELI: CPT

## 2024-02-01 NOTE — PROGRESS NOTES
Transcranial Magnetic Stimulation Procedure    Name: Leah Dorado    Date: 2/1/2024    Treatment: 4/35    Procedure Performed: Transcranial Magnetic Stimulation (TMS)    Procedure Detail: The optimal treatment parameters as determined during the initial Motor Threshold TMS device. The patient was positioned in the TMS treatment chair. The Magstim TMS treatment coil was placed at the optimal treatment location as determined during the initial Motor Threshold and Treatment Location Determination Procedure, and the treatment parameters for the patient were confirmed. The Magstim TMS Treatment was delivered. Patient was monitored during the TMS treatment.    Subjective:  Dose at which the procedure was performed: 94  Patient's response before and after the procedure: Tolerating TMS treatment without issues, no side effects voiced. Voicing  noticed affect brighter today. Working on motivation to complete tasks but continues to struggle with it. Support offered that beginning of treatment and to stay positive about efficacy. 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   Patient to return on 2/2/2024      Signed: Ivania Granados LPN, 2/1/2024

## 2024-02-02 ENCOUNTER — HOSPITAL ENCOUNTER (OUTPATIENT)
Dept: PSYCHIATRY | Age: 79
Setting detail: THERAPIES SERIES
Discharge: HOME | End: 2024-02-02
Payer: MEDICARE

## 2024-02-02 PROCEDURE — 90868 TCRANIAL MAGN STIM TX DELI: CPT

## 2024-02-02 NOTE — PROGRESS NOTES
Transcranial Magnetic Stimulation Procedure    Name: Leah Dorado    Date: 2/2/2024    Treatment: 5/35    Procedure Performed: Transcranial Magnetic Stimulation (TMS)    Procedure Detail: The optimal treatment parameters as determined during the initial Motor Threshold TMS device. The patient was positioned in the TMS treatment chair. The Magstim TMS treatment coil was placed at the optimal treatment location as determined during the initial Motor Threshold and Treatment Location Determination Procedure, and the treatment parameters for the patient were confirmed. The Magstim TMS Treatment was delivered. Patient was monitored during the TMS treatment.    Subjective:  Dose at which the procedure was performed: 98  Patient's response before and after the procedure: Tolerating TMS treatment without issue, no headache or site tenderness voiced. Some concerns voiced with having a hard time falling asleep even with taking trazodone. Support offered about mental health can cause sleep issues and that TMS could be increasing more neurotransmitter activity. 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   Patient to return on 2/5/2024      Signed: Ivania Granados LPN, 2/2/2024

## 2024-02-05 ENCOUNTER — HOSPITAL ENCOUNTER (OUTPATIENT)
Dept: PSYCHIATRY | Age: 79
Setting detail: THERAPIES SERIES
Discharge: HOME | End: 2024-02-05
Payer: MEDICARE

## 2024-02-05 PROCEDURE — 90868 TCRANIAL MAGN STIM TX DELI: CPT

## 2024-02-05 NOTE — PROGRESS NOTES
Transcranial Magnetic Stimulation Procedure    Name: Leah Dorado    Date: 2/5/2024    Treatment: 6/35    Procedure Performed: Transcranial Magnetic Stimulation (TMS)    Procedure Detail: The optimal treatment parameters as determined during the initial Motor Threshold TMS device. The patient was positioned in the TMS treatment chair. The Magstim TMS treatment coil was placed at the optimal treatment location as determined during the initial Motor Threshold and Treatment Location Determination Procedure, and the treatment parameters for the patient were confirmed. The Magstim TMS Treatment was delivered. Patient was monitored during the TMS treatment.    Subjective:  Dose at which the procedure was performed: 100  Patient's response before and after the procedure: Tolerating TMS treatment without issue, no side effects voiced. Mood and affect bright, talkative and engaging this visit. Still struggles with getting motivated to complete tasks and energy is low. 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   Patient to return on 2/6/2024      Signed: Ivania Granados LPN, 2/5/2024

## 2024-02-06 ENCOUNTER — HOSPITAL ENCOUNTER (OUTPATIENT)
Dept: PSYCHIATRY | Age: 79
Setting detail: THERAPIES SERIES
Discharge: HOME | End: 2024-02-06
Payer: MEDICARE

## 2024-02-06 PROCEDURE — 90868 TCRANIAL MAGN STIM TX DELI: CPT

## 2024-02-06 NOTE — PROGRESS NOTES
Transcranial Magnetic Stimulation Procedure    Name: Leah Dorado    Date: 2/6/2024    Treatment: 7/35    Procedure Performed: Transcranial Magnetic Stimulation (TMS)    Procedure Detail: The optimal treatment parameters as determined during the initial Motor Threshold TMS device. The patient was positioned in the TMS treatment chair. The Magstim TMS treatment coil was placed at the optimal treatment location as determined during the initial Motor Threshold and Treatment Location Determination Procedure, and the treatment parameters for the patient were confirmed. The Magstim TMS Treatment was delivered. Patient was monitored during the TMS treatment.    Subjective:  Dose at which the procedure was performed: 100  Patient's response before and after the procedure: Tolerating TMS treatment without issue, no side effects voiced. Hygiene and grooming good. Continue to struggle with motivation and energy to complete tasks.  has noticed some change in affect looking happier, per pt. 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   Patient to return on 2/7/2024      Signed: Ivania Granados LPN, 2/6/2024

## 2024-02-07 ENCOUNTER — HOSPITAL ENCOUNTER (OUTPATIENT)
Dept: PSYCHIATRY | Age: 79
Setting detail: THERAPIES SERIES
Discharge: HOME | End: 2024-02-07
Payer: MEDICARE

## 2024-02-07 PROCEDURE — 90868 TCRANIAL MAGN STIM TX DELI: CPT

## 2024-02-07 NOTE — PROGRESS NOTES
Transcranial Magnetic Stimulation Procedure    Name: Leah Dorado    Date: 2/7/2024    Treatment: 8/35    Procedure Performed: Transcranial Magnetic Stimulation (TMS)    Procedure Detail: The optimal treatment parameters as determined during the initial Motor Threshold TMS device. The patient was positioned in the TMS treatment chair. The Magstim TMS treatment coil was placed at the optimal treatment location as determined during the initial Motor Threshold and Treatment Location Determination Procedure, and the treatment parameters for the patient were confirmed. The Magstim TMS Treatment was delivered. Patient was monitored during the TMS treatment.    Subjective:  Dose at which the procedure was performed: 100  Patient's response before and after the procedure: Tolerating TMS treatment without issue, no side effects voiced. Voicing treatment area feeling tender during treatment but resolves after tx. Mood and affect bright, making conversations. \"I don't know if its working yet\" when asked about any change in mood. Will continue to offer supports as needed, Monitor during TMS treatments, and track efficacy with PHQ 9.    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   Patient to return on 2/8/2024       Signed: Ivania Granados LPN, 2/7/2024

## 2024-02-08 ENCOUNTER — HOSPITAL ENCOUNTER (OUTPATIENT)
Dept: PSYCHIATRY | Age: 79
Setting detail: THERAPIES SERIES
Discharge: HOME | End: 2024-02-08
Payer: MEDICARE

## 2024-02-08 PROCEDURE — 90868 TCRANIAL MAGN STIM TX DELI: CPT

## 2024-02-08 NOTE — PROGRESS NOTES
Transcranial Magnetic Stimulation Procedure    Name: Leah Dorado    Date: 2/8/2024    Treatment: 9/35    Procedure Performed: Transcranial Magnetic Stimulation (TMS)    Procedure Detail: The optimal treatment parameters as determined during the initial Motor Threshold TMS device. The patient was positioned in the TMS treatment chair. The Magstim TMS treatment coil was placed at the optimal treatment location as determined during the initial Motor Threshold and Treatment Location Determination Procedure, and the treatment parameters for the patient were confirmed. The Magstim TMS Treatment was delivered. Patient was monitored during the TMS treatment.    Subjective:  Dose at which the procedure was performed: 100  Patient's response before and after the procedure: Tolerating TMS treatment without issue, no side effects voiced. Some complaints with taking longer to fall asleep but support offered on possible cause could be effects from TMS. 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   Patient to return on 2/9/2024      Signed: Ivania Granados LPN, 2/8/2024

## 2024-02-09 ENCOUNTER — HOSPITAL ENCOUNTER (OUTPATIENT)
Dept: PSYCHIATRY | Age: 79
Setting detail: THERAPIES SERIES
Discharge: HOME | End: 2024-02-09
Payer: MEDICARE

## 2024-02-09 PROCEDURE — 90868 TCRANIAL MAGN STIM TX DELI: CPT

## 2024-02-09 NOTE — PROGRESS NOTES
Transcranial Magnetic Stimulation Procedure    Name: Leah Dorado    Date: 2/9/2024    Treatment: 10/35    Procedure Performed: Transcranial Magnetic Stimulation (TMS)    Procedure Detail: The optimal treatment parameters as determined during the initial Motor Threshold TMS device. The patient was positioned in the TMS treatment chair. The Magstim TMS treatment coil was placed at the optimal treatment location as determined during the initial Motor Threshold and Treatment Location Determination Procedure, and the treatment parameters for the patient were confirmed. The Magstim TMS Treatment was delivered. Patient was monitored during the TMS treatment.    Subjective:  Dose at which the procedure was performed: 100  Patient's response before and after the procedure: Tolerating TMS treatment without issue, no side effects voiced. Sleep was good last night. Affect and mood bright. 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   Patient to return on 2/12/2024      Signed: Ivania Granados LPN, 2/9/2024

## 2024-02-12 ENCOUNTER — HOSPITAL ENCOUNTER (OUTPATIENT)
Dept: PSYCHIATRY | Age: 79
Setting detail: THERAPIES SERIES
Discharge: HOME | End: 2024-02-12
Payer: MEDICARE

## 2024-02-12 PROCEDURE — 99214 OFFICE O/P EST MOD 30 MIN: CPT | Performed by: PSYCHIATRY & NEUROLOGY

## 2024-02-12 PROCEDURE — 90868 TCRANIAL MAGN STIM TX DELI: CPT

## 2024-02-12 NOTE — PROGRESS NOTES
Transcranial Magnetic Stimulation Procedure    Name: Leah Dorado    Date: 2/12/2024    Treatment: 11/35    Procedure Performed: Transcranial Magnetic Stimulation (TMS)    Procedure Detail: The optimal treatment parameters as determined during the initial Motor Threshold TMS device. The patient was positioned in the TMS treatment chair. The Magstim TMS treatment coil was placed at the optimal treatment location as determined during the initial Motor Threshold and Treatment Location Determination Procedure, and the treatment parameters for the patient were confirmed. The Magstim TMS Treatment was delivered. Patient was monitored during the TMS treatment.    Subjective:  Dose at which the procedure was performed: 100  Patient's response before and after the procedure: Tolerating TMS treatment without issue, no side effects voiced. Sleep does vary and takes sleep medications. Energy level is lower than what is desired but overall depression symptoms have decreased.  has noticed and commented on affect and mood change. Increase in appetite and doesn't know if it due to boredom or depression symptom. 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   Patient to return on 2/13/2024      Signed: Ivania Granados LPN, 2/12/2024

## 2024-02-12 NOTE — PROGRESS NOTES
PSYCHIATRY ATTENDING NOTE    CC: \"I feel good.\"    S: Patient being seen at outpatient clinic in follow-up for depression and anxiety. Met with patient to discuss progress with TMS therapy.    Leah presents in good spirits  Noticeably calmer and reports anxiety level has greatly diminished  Reports depression much better - PHQ-9 has improved from 18 to 4  Tolerating TMS without incident  No recent panic attacks, has not needed Klonopin    MSE: White female appears age. Pleasant, cooperative, forthcoming. Normal psychomotor activity, gait, strength, tone, eye contact. Mood euthymic. Affect flexible. Speech clear. Thought process organized. Content future-oriented. Focused on anxiety symptoms. No suicidal or homicidal ideations. No paranoia, delusions, hallucinations. Orientation, concentration, recent and remote memory are grossly intact. Fund of knowledge fair. Language use fair. Insight and judgment fair.      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 Continue TMS and same medications for now. Discussed eventual streamlining of medications. See back 3-4 weeks.                           Electronically signed by Humza Perez MD on 2/12/2024 at 11:03 AM

## 2024-02-13 ENCOUNTER — HOSPITAL ENCOUNTER (OUTPATIENT)
Dept: PSYCHIATRY | Age: 79
Setting detail: THERAPIES SERIES
Discharge: HOME | End: 2024-02-13
Payer: MEDICARE

## 2024-02-13 PROCEDURE — 90868 TCRANIAL MAGN STIM TX DELI: CPT

## 2024-02-13 NOTE — PROGRESS NOTES
Transcranial Magnetic Stimulation Procedure    Name: Leah Dorado    Date: 2/13/2024    Treatment: 12/35    Procedure Performed: Transcranial Magnetic Stimulation (TMS)    Procedure Detail: The optimal treatment parameters as determined during the initial Motor Threshold TMS device. The patient was positioned in the TMS treatment chair. The Magstim TMS treatment coil was placed at the optimal treatment location as determined during the initial Motor Threshold and Treatment Location Determination Procedure, and the treatment parameters for the patient were confirmed. The Magstim TMS Treatment was delivered. Patient was monitored during the TMS treatment.    Subjective:  Dose at which the procedure was performed: 100  Patient's response before and after the procedure: Tolerating TMS treatment without issue, no side effects voiced. Depression symptoms have improved and added benefit of anxiety has also decreased since TMS. 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   Patient to return on 2/14/2024      Signed: Ivania Granados LPN, 2/13/2024

## 2024-02-14 ENCOUNTER — HOSPITAL ENCOUNTER (OUTPATIENT)
Dept: PSYCHIATRY | Age: 79
Setting detail: THERAPIES SERIES
Discharge: HOME | End: 2024-02-14
Payer: MEDICARE

## 2024-02-14 PROCEDURE — 90868 TCRANIAL MAGN STIM TX DELI: CPT

## 2024-02-14 NOTE — PROGRESS NOTES
Transcranial Magnetic Stimulation Procedure    Name: Leah Dorado    Date: 2/14/2024    Treatment: 13/35    Procedure Performed: Transcranial Magnetic Stimulation (TMS)    Procedure Detail: The optimal treatment parameters as determined during the initial Motor Threshold TMS device. The patient was positioned in the TMS treatment chair. The Magstim TMS treatment coil was placed at the optimal treatment location as determined during the initial Motor Threshold and Treatment Location Determination Procedure, and the treatment parameters for the patient were confirmed. The Magstim TMS Treatment was delivered. Patient was monitored during the TMS treatment.    Subjective:  Dose at which the procedure was performed: 100  Patient's response before and after the procedure: Tolerating TMS treatment without issue, no side effects voiced. Pt stating \" I am feeling good, I went out with friends for lunch yesterday and I had fun and laughed. I haven't felt like that in awhile (not depressed or anxious).\" Will continue to offer supports as needed, Monitor during TMS treatments, and track efficacy with PHQ 9.    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   Patient to return on 2/15/2024      Signed: Ivania Granados LPN, 2/14/2024

## 2024-02-15 ENCOUNTER — HOSPITAL ENCOUNTER (OUTPATIENT)
Dept: PSYCHIATRY | Age: 79
Setting detail: THERAPIES SERIES
Discharge: HOME | End: 2024-02-15
Payer: MEDICARE

## 2024-02-15 PROCEDURE — 90868 TCRANIAL MAGN STIM TX DELI: CPT

## 2024-02-15 NOTE — PROGRESS NOTES
Transcranial Magnetic Stimulation Procedure    Name: Leah Dorado    Date: 2/15/2024    Treatment: 14/35    Procedure Performed: Transcranial Magnetic Stimulation (TMS)    Procedure Detail: The optimal treatment parameters as determined during the initial Motor Threshold TMS device. The patient was positioned in the TMS treatment chair. The Magstim TMS treatment coil was placed at the optimal treatment location as determined during the initial Motor Threshold and Treatment Location Determination Procedure, and the treatment parameters for the patient were confirmed. The Magstim TMS Treatment was delivered. Patient was monitored during the TMS treatment.    Subjective:  Dose at which the procedure was performed: 100  Patient's response before and after the procedure: Tolerating TMS treatment without issue, no side effects voiced. Patient affect and mood brighter and eye contact good. Patients family and friends have noticed change in mood. Voicing depression and anxiety have decreased and mood has improved. 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   Patient to return on 2/16/2024      Signed: Ivania Granados LPN, 2/15/2024

## 2024-02-16 ENCOUNTER — HOSPITAL ENCOUNTER (OUTPATIENT)
Dept: PSYCHIATRY | Age: 79
Setting detail: THERAPIES SERIES
Discharge: HOME | End: 2024-02-16
Payer: MEDICARE

## 2024-02-16 PROCEDURE — 90868 TCRANIAL MAGN STIM TX DELI: CPT

## 2024-02-16 NOTE — PROGRESS NOTES
Transcranial Magnetic Stimulation Procedure    Name: Leah Dorado    Date: 2/16/2024    Treatment: 15/35    Procedure Performed: Transcranial Magnetic Stimulation (TMS)    Procedure Detail: The optimal treatment parameters as determined during the initial Motor Threshold TMS device. The patient was positioned in the TMS treatment chair. The Magstim TMS treatment coil was placed at the optimal treatment location as determined during the initial Motor Threshold and Treatment Location Determination Procedure, and the treatment parameters for the patient were confirmed. The Magstim TMS Treatment was delivered. Patient was monitored during the TMS treatment.    Subjective:  Dose at which the procedure was performed: 100  Patient's response before and after the procedure: Tolerated TMS treatment without issue, no side effects voiced. 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 Depression symptoms have improved and stating \" I never knew I could feel this good, I was like I'll try this (TMS) but didn't think it would work but it has\" Family and friends have commented on difference in mood and appearance. Going back to work but will start only a couple of hrs a week. Will continue to offer supports as needed, Monitor during TMS treatments, and track efficacy with PHQ 9.    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   Patient to return on 2/19/2024      Signed: Ivania Granados LPN, 2/16/2024

## 2024-02-19 ENCOUNTER — HOSPITAL ENCOUNTER (OUTPATIENT)
Dept: PSYCHIATRY | Age: 79
Setting detail: THERAPIES SERIES
Discharge: HOME | End: 2024-02-19
Payer: MEDICARE

## 2024-02-19 PROCEDURE — 90868 TCRANIAL MAGN STIM TX DELI: CPT

## 2024-02-19 NOTE — PROGRESS NOTES
Transcranial Magnetic Stimulation Procedure    Name: Leah Dorado    Date: 2/19/2024    Treatment: 16/35    Procedure Performed: Transcranial Magnetic Stimulation (TMS)    Procedure Detail: The optimal treatment parameters as determined during the initial Motor Threshold TMS device. The patient was positioned in the TMS treatment chair. The Magstim TMS treatment coil was placed at the optimal treatment location as determined during the initial Motor Threshold and Treatment Location Determination Procedure, and the treatment parameters for the patient were confirmed. The Magstim TMS Treatment was delivered. Patient was monitored during the TMS treatment.    Subjective:  Dose at which the procedure was performed: 100  Patient's response before and after the procedure: Tolerating TMS treatment without issue, no side effects voiced. Return back to work over the weekend and voiced depression symptoms have decreased. Anxiety has also decreased. Happy with the results since starting TMS. 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   Patient to return on 2/20/2024      Signed: Ivania Granados LPN, 2/19/2024

## 2024-02-20 ENCOUNTER — HOSPITAL ENCOUNTER (OUTPATIENT)
Dept: PSYCHIATRY | Age: 79
Setting detail: THERAPIES SERIES
Discharge: HOME | End: 2024-02-20
Payer: MEDICARE

## 2024-02-20 PROCEDURE — 90868 TCRANIAL MAGN STIM TX DELI: CPT

## 2024-02-20 NOTE — PROGRESS NOTES
Transcranial Magnetic Stimulation Procedure    Name: Leah Dorado    Date: 2/20/2024    Treatment: 17/35    Procedure Performed: Transcranial Magnetic Stimulation (TMS)    Procedure Detail: The optimal treatment parameters as determined during the initial Motor Threshold TMS device. The patient was positioned in the TMS treatment chair. The Magstim TMS treatment coil was placed at the optimal treatment location as determined during the initial Motor Threshold and Treatment Location Determination Procedure, and the treatment parameters for the patient were confirmed. The Magstim TMS Treatment was delivered. Patient was monitored during the TMS treatment.    Subjective:  Dose at which the procedure was performed: 100  Patient's response before and after the procedure: Tolerating TMS treatment without issues, no side effects voiced. Mood and affect bright, making good eye contact. Feeling positive about the results of TMS, decreased depression and anxiety. Voicing when at work last weekend, less stressed even with difficult customers. 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   Patient to return on 2/21/2024      Signed: Ivania Granados LPN, 2/20/2024

## 2024-02-21 ENCOUNTER — HOSPITAL ENCOUNTER (OUTPATIENT)
Dept: PSYCHIATRY | Age: 79
Setting detail: THERAPIES SERIES
Discharge: HOME | End: 2024-02-21
Payer: MEDICARE

## 2024-02-21 PROCEDURE — 90868 TCRANIAL MAGN STIM TX DELI: CPT

## 2024-02-21 NOTE — PROGRESS NOTES
Transcranial Magnetic Stimulation Procedure    Name: Leah Dorado    Date: 2/21/2024    Treatment: 18/35    Procedure Performed: Transcranial Magnetic Stimulation (TMS)    Procedure Detail: The optimal treatment parameters as determined during the initial Motor Threshold TMS device. The patient was positioned in the TMS treatment chair. The Magstim TMS treatment coil was placed at the optimal treatment location as determined during the initial Motor Threshold and Treatment Location Determination Procedure, and the treatment parameters for the patient were confirmed. The Magstim TMS Treatment was delivered. Patient was monitored during the TMS treatment.    Subjective:  Dose at which the procedure was performed: 100  Patient's response before and after the procedure: Tolerating TMS treatment without issues, no side effects voiced. Increased energy and felt bored/restless yesterday (unusual for pt). Motivation and getting tasks complete improving. 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   Patient to return on 2/22/2024      Signed: Ivania Granados LPN, 2/21/2024 18

## 2024-02-22 ENCOUNTER — HOSPITAL ENCOUNTER (OUTPATIENT)
Dept: PSYCHIATRY | Age: 79
Setting detail: THERAPIES SERIES
Discharge: HOME | End: 2024-02-22
Payer: MEDICARE

## 2024-02-22 PROCEDURE — 90868 TCRANIAL MAGN STIM TX DELI: CPT

## 2024-02-22 NOTE — PROGRESS NOTES
Transcranial Magnetic Stimulation Procedure    Name: Leah Dorado    Date: 2/22/2024    Treatment: 19/35    Procedure Performed: Transcranial Magnetic Stimulation (TMS)    Procedure Detail: The optimal treatment parameters as determined during the initial Motor Threshold TMS device. The patient was positioned in the TMS treatment chair. The Magstim TMS treatment coil was placed at the optimal treatment location as determined during the initial Motor Threshold and Treatment Location Determination Procedure, and the treatment parameters for the patient were confirmed. The Magstim TMS Treatment was delivered. Patient was monitored during the TMS treatment.    Subjective:  Dose at which the procedure was performed: 100  Patient's response before and after the procedure: Tolerating TMS treatment without issues, no side effects voiced. Depression symptoms have improved since starting TMS and voicing \" I never knew I could feel this way\" Starting back to work a couple of hrs during the weekend and noting that anxiety at work has decreased. 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   Patient to return on 2/23/2024      Signed: Ivania Granados LPN, 2/22/2024

## 2024-02-23 ENCOUNTER — HOSPITAL ENCOUNTER (OUTPATIENT)
Dept: PSYCHIATRY | Age: 79
Setting detail: THERAPIES SERIES
Discharge: HOME | End: 2024-02-23
Payer: MEDICARE

## 2024-02-23 PROCEDURE — 90868 TCRANIAL MAGN STIM TX DELI: CPT

## 2024-02-23 NOTE — PROGRESS NOTES
Transcranial Magnetic Stimulation Procedure    Name: Leah Dorado    Date: 2/23/2024    Treatment: 20/35    Procedure Performed: Transcranial Magnetic Stimulation (TMS)    Procedure Detail: The optimal treatment parameters as determined during the initial Motor Threshold TMS device. The patient was positioned in the TMS treatment chair. The Magstim TMS treatment coil was placed at the optimal treatment location as determined during the initial Motor Threshold and Treatment Location Determination Procedure, and the treatment parameters for the patient were confirmed. The Magstim TMS Treatment was delivered. Patient was monitored during the TMS treatment.    Subjective:  Dose at which the procedure was performed: 100  Patient's response before and after the procedure: Tolerating TMS treatment without issue, no side effect voiced. Patient continues to voice improvements in both depression and anxiety. Looking forward to working this weekend. Motivation improving and wanting to get out of house. 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   Patient to return on 2/26/2024      Signed: Ivania Granados LPN, 2/23/2024

## 2024-02-26 ENCOUNTER — HOSPITAL ENCOUNTER (OUTPATIENT)
Dept: PSYCHIATRY | Age: 79
Setting detail: THERAPIES SERIES
Discharge: HOME | End: 2024-02-26
Payer: MEDICARE

## 2024-02-26 PROCEDURE — 90868 TCRANIAL MAGN STIM TX DELI: CPT

## 2024-02-26 NOTE — PROGRESS NOTES
Transcranial Magnetic Stimulation Procedure    Name: Leah Dorado    Date: 2/26/2024    Treatment: 21/35    Procedure Performed: Transcranial Magnetic Stimulation (TMS)    Procedure Detail: The optimal treatment parameters as determined during the initial Motor Threshold TMS device. The patient was positioned in the TMS treatment chair. The Magstim TMS treatment coil was placed at the optimal treatment location as determined during the initial Motor Threshold and Treatment Location Determination Procedure, and the treatment parameters for the patient were confirmed. The Magstim TMS Treatment was delivered. Patient was monitored during the TMS treatment.    Subjective:  Dose at which the procedure was performed: 100  Patient's response before and after the procedure: Tolerating TMS treatment without issue, no side effects voiced. Patient voicing \"didn't know I could feel this good, I have been depressed for so long it felt normal\" Noticed change in being more positive and not letting stressors cause moodiness and able to remain calm. Anxiety has decreased also. Coworkers voiced noticeable change in affect and mood, overall being positive and kind. 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   Patient to return on 2/27/2024      Signed: Ivania Granados LPN, 2/26/2024

## 2024-02-27 ENCOUNTER — HOSPITAL ENCOUNTER (OUTPATIENT)
Dept: PSYCHIATRY | Age: 79
Setting detail: THERAPIES SERIES
Discharge: HOME | End: 2024-02-27
Payer: MEDICARE

## 2024-02-27 PROCEDURE — 90868 TCRANIAL MAGN STIM TX DELI: CPT

## 2024-02-27 NOTE — PROGRESS NOTES
Transcranial Magnetic Stimulation Procedure    Name: Leah Dorado    Date: 2/27/2024    Treatment: 21/35    Procedure Performed: Transcranial Magnetic Stimulation (TMS)    Procedure Detail: The optimal treatment parameters as determined during the initial Motor Threshold TMS device. The patient was positioned in the TMS treatment chair. The Magstim TMS treatment coil was placed at the optimal treatment location as determined during the initial Motor Threshold and Treatment Location Determination Procedure, and the treatment parameters for the patient were confirmed. The Magstim TMS Treatment was delivered. Patient was monitored during the TMS treatment.    Subjective:  Dose at which the procedure was performed: 100  Patient's response before and after the procedure: Tolerating TMS treatment without issues, no side effects voiced. Mood and affect bright. Feeling less anxiety and calmer. Depression symptoms decreased and overall feeling \"great, I can't believe the difference\" Will continue to offer supports as needed, Monitor during TMS treatments, and track efficacy with PHQ 9.    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   Patient to return on 2/28/2024        Signed: Ivania Granados LPN, 2/27/2024

## 2024-02-28 ENCOUNTER — HOSPITAL ENCOUNTER (OUTPATIENT)
Dept: PSYCHIATRY | Age: 79
Setting detail: THERAPIES SERIES
Discharge: HOME | End: 2024-02-28
Payer: MEDICARE

## 2024-02-28 PROCEDURE — 90868 TCRANIAL MAGN STIM TX DELI: CPT

## 2024-02-28 ASSESSMENT — PATIENT HEALTH QUESTIONNAIRE - PHQ9
7. TROUBLE CONCENTRATING ON THINGS, SUCH AS READING THE NEWSPAPER OR WATCHING TELEVISION: 0
5. POOR APPETITE OR OVEREATING: 0
3. TROUBLE FALLING OR STAYING ASLEEP: 1
6. FEELING BAD ABOUT YOURSELF - OR THAT YOU ARE A FAILURE OR HAVE LET YOURSELF OR YOUR FAMILY DOWN: 0
SUM OF ALL RESPONSES TO PHQ QUESTIONS 1-9: 3
4. FEELING TIRED OR HAVING LITTLE ENERGY: 1
SUM OF ALL RESPONSES TO PHQ9 QUESTIONS 1 & 2: 1
8. MOVING OR SPEAKING SO SLOWLY THAT OTHER PEOPLE COULD HAVE NOTICED. OR THE OPPOSITE, BEING SO FIGETY OR RESTLESS THAT YOU HAVE BEEN MOVING AROUND A LOT MORE THAN USUAL: 0
9. THOUGHTS THAT YOU WOULD BE BETTER OFF DEAD, OR OF HURTING YOURSELF: 0
2. FEELING DOWN, DEPRESSED OR HOPELESS: 0
SUM OF ALL RESPONSES TO PHQ QUESTIONS 1-9: 3
1. LITTLE INTEREST OR PLEASURE IN DOING THINGS: 1
SUM OF ALL RESPONSES TO PHQ QUESTIONS 1-9: 3
10. IF YOU CHECKED OFF ANY PROBLEMS, HOW DIFFICULT HAVE THESE PROBLEMS MADE IT FOR YOU TO DO YOUR WORK, TAKE CARE OF THINGS AT HOME, OR GET ALONG WITH OTHER PEOPLE: 0
SUM OF ALL RESPONSES TO PHQ QUESTIONS 1-9: 3

## 2024-02-28 NOTE — PROGRESS NOTES
Transcranial Magnetic Stimulation Procedure    Name: Leah Dorado    Date: 2/28/2024    Treatment: 23/35    Procedure Performed: Transcranial Magnetic Stimulation (TMS)    Procedure Detail: The optimal treatment parameters as determined during the initial Motor Threshold TMS device. The patient was positioned in the TMS treatment chair. The Magstim TMS treatment coil was placed at the optimal treatment location as determined during the initial Motor Threshold and Treatment Location Determination Procedure, and the treatment parameters for the patient were confirmed. The Magstim TMS Treatment was delivered. Patient was monitored during the TMS treatment.    Subjective:  Dose at which the procedure was performed: 100  Patient's response before and after the procedure: Tolerating TMS treatment without issue, no side effects voiced. Mood and affect bright and making appropriate conversation. Improvements in getting tasks complete and motivation. 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   Patient to return on 2/29/2024      Signed: Ivania Granados LPN, 2/28/2024

## 2024-02-29 ENCOUNTER — HOSPITAL ENCOUNTER (OUTPATIENT)
Dept: PSYCHIATRY | Age: 79
Setting detail: THERAPIES SERIES
Discharge: HOME | End: 2024-02-29
Payer: MEDICARE

## 2024-02-29 PROCEDURE — 90868 TCRANIAL MAGN STIM TX DELI: CPT

## 2024-02-29 NOTE — PROGRESS NOTES
Transcranial Magnetic Stimulation Procedure    Name: Leah Dorado    Date: 2/29/2024    Treatment: 24/35    Procedure Performed: Transcranial Magnetic Stimulation (TMS)    Procedure Detail: The optimal treatment parameters as determined during the initial Motor Threshold TMS device. The patient was positioned in the TMS treatment chair. The Magstim TMS treatment coil was placed at the optimal treatment location as determined during the initial Motor Threshold and Treatment Location Determination Procedure, and the treatment parameters for the patient were confirmed. The Magstim TMS Treatment was delivered. Patient was monitored during the TMS treatment.    Subjective:  Dose at which the procedure was performed: 100  Patient's response before and after the procedure: Tolerated TMS without issue, no side effects voiced. Depression and anxiety symptoms have improved and satisfied with the results of TMS. 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   Patient to return on 3/1/2024      Signed: Ivania Granados LPN, 2/29/2024

## 2024-03-01 ENCOUNTER — HOSPITAL ENCOUNTER (OUTPATIENT)
Dept: PSYCHIATRY | Age: 79
Setting detail: THERAPIES SERIES
Discharge: HOME | End: 2024-03-01
Payer: MEDICARE

## 2024-03-01 PROCEDURE — 90868 TCRANIAL MAGN STIM TX DELI: CPT

## 2024-03-01 NOTE — PROGRESS NOTES
Transcranial Magnetic Stimulation Procedure    Name: Leah Dorado    Date: 3/1/2024    Treatment: 25/35    Procedure Performed: Transcranial Magnetic Stimulation (TMS)    Procedure Detail: The optimal treatment parameters as determined during the initial Motor Threshold TMS device. The patient was positioned in the TMS treatment chair. The Magstim TMS treatment coil was placed at the optimal treatment location as determined during the initial Motor Threshold and Treatment Location Determination Procedure, and the treatment parameters for the patient were confirmed. The Magstim TMS Treatment was delivered. Patient was monitored during the TMS treatment.    Subjective:  Dose at which the procedure was performed: 100  Patient's response before and after the procedure: Tolerating TMS treatment without issue, no side effects voiced. Patient mood and affect bright. Looking forward to nicer weather to garden and enjoy. 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   Patient to return on 3/4/2024      Signed: Ivania Granados LPN, 3/1/2024

## 2024-03-04 ENCOUNTER — HOSPITAL ENCOUNTER (OUTPATIENT)
Dept: PSYCHIATRY | Age: 79
Setting detail: THERAPIES SERIES
Discharge: HOME | End: 2024-03-04
Payer: MEDICARE

## 2024-03-04 PROCEDURE — 90868 TCRANIAL MAGN STIM TX DELI: CPT

## 2024-03-04 NOTE — PROGRESS NOTES
Transcranial Magnetic Stimulation Procedure    Name: Leah Dorado    Date: 3/4/2024    Treatment: 26/35    Procedure Performed: Transcranial Magnetic Stimulation (TMS)    Procedure Detail: The optimal treatment parameters as determined during the initial Motor Threshold TMS device. The patient was positioned in the TMS treatment chair. The Magstim TMS treatment coil was placed at the optimal treatment location as determined during the initial Motor Threshold and Treatment Location Determination Procedure, and the treatment parameters for the patient were confirmed. The Magstim TMS Treatment was delivered. Patient was monitored during the TMS treatment.    Subjective:  Dose at which the procedure was performed: 100  Patient's response before and after the procedure: Tolerating TMS treatment without issue, no side effects voiced. Patient feeling relaxed and able to cope with stressors better at work. Pleasant mood and affect bright. 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   Patient to return on 3/5/2024      Signed: Ivania Granados LPN, 3/4/2024

## 2024-03-05 ENCOUNTER — HOSPITAL ENCOUNTER (OUTPATIENT)
Dept: PSYCHIATRY | Age: 79
Setting detail: THERAPIES SERIES
Discharge: HOME | End: 2024-03-05
Payer: MEDICARE

## 2024-03-05 PROCEDURE — 90868 TCRANIAL MAGN STIM TX DELI: CPT

## 2024-03-05 NOTE — PROGRESS NOTES
Transcranial Magnetic Stimulation Procedure    Name: Leah Dorado    Date: 3/5/2024    Treatment: 27/35    Procedure Performed: Transcranial Magnetic Stimulation (TMS)    Procedure Detail: The optimal treatment parameters as determined during the initial Motor Threshold TMS device. The patient was positioned in the TMS treatment chair. The Magstim TMS treatment coil was placed at the optimal treatment location as determined during the initial Motor Threshold and Treatment Location Determination Procedure, and the treatment parameters for the patient were confirmed. The Magstim TMS Treatment was delivered. Patient was monitored during the TMS treatment.    Subjective:  Dose at which the procedure was performed: 100  Patient's response before and after the procedure: Tolerating TMS treatment without issues, no side effects voiced. Depression symptoms have improved and satisfied with the results. Goal and future focused, going to work on the outdoor area of [employer]. Family and coworkers noticing a positive change in mood. 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   Patient to return on 3/6/2024      Signed: Ivania Granados LPN, 3/5/2024

## 2024-03-06 ENCOUNTER — HOSPITAL ENCOUNTER (OUTPATIENT)
Dept: PSYCHIATRY | Age: 79
Setting detail: THERAPIES SERIES
Discharge: HOME | End: 2024-03-06
Payer: MEDICARE

## 2024-03-06 PROCEDURE — 90868 TCRANIAL MAGN STIM TX DELI: CPT

## 2024-03-06 NOTE — PROGRESS NOTES
Transcranial Magnetic Stimulation Procedure    Name: Leah Dorado    Date: 3/6/2024    Treatment: 28/35    Procedure Performed: Transcranial Magnetic Stimulation (TMS)    Procedure Detail: The optimal treatment parameters as determined during the initial Motor Threshold TMS device. The patient was positioned in the TMS treatment chair. The Magstim TMS treatment coil was placed at the optimal treatment location as determined during the initial Motor Threshold and Treatment Location Determination Procedure, and the treatment parameters for the patient were confirmed. The Magstim TMS Treatment was delivered. Patient was monitored during the TMS treatment.    Subjective:  Dose at which the procedure was performed: 100  Patient's response before and after the procedure: Tolerating TMS treatment without issue, no side effects voiced. Mood and affect bright, talkative and making appropriate conversation. Motivation improving, getting tasks completed without procrastinating. 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   Patient to return on 3/7/2024      Signed: Ivania Granados LPN, 3/6/2024

## 2024-03-07 ENCOUNTER — HOSPITAL ENCOUNTER (OUTPATIENT)
Dept: PSYCHIATRY | Age: 79
Setting detail: THERAPIES SERIES
Discharge: HOME | End: 2024-03-07
Payer: MEDICARE

## 2024-03-07 PROCEDURE — 90868 TCRANIAL MAGN STIM TX DELI: CPT

## 2024-03-07 NOTE — PROGRESS NOTES
Transcranial Magnetic Stimulation Procedure    Name: Leah Dorado    Date: 3/7/2024    Treatment: 29/35    Procedure Performed: Transcranial Magnetic Stimulation (TMS)    Procedure Detail: The optimal treatment parameters as determined during the initial Motor Threshold TMS device. The patient was positioned in the TMS treatment chair. The Magstim TMS treatment coil was placed at the optimal treatment location as determined during the initial Motor Threshold and Treatment Location Determination Procedure, and the treatment parameters for the patient were confirmed. The Magstim TMS Treatment was delivered. Patient was monitored during the TMS treatment.    Subjective:  Dose at which the procedure was performed: 100  Patient's response before and after the procedure: Tolerating TMS treatment without issue, no side effects voiced. Patient mood and affect bright. Continue to voice satisfaction with results from TMS (decrease in both depression and anxiety). 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   Patient to return on 3/11/2024      Signed: Ivania Granados LPN, 3/7/2024

## 2024-03-08 ENCOUNTER — HOSPITAL ENCOUNTER (OUTPATIENT)
Dept: PSYCHIATRY | Age: 79
Setting detail: THERAPIES SERIES
Discharge: HOME | End: 2024-03-08
Payer: MEDICARE

## 2024-03-11 ENCOUNTER — HOSPITAL ENCOUNTER (OUTPATIENT)
Dept: PSYCHIATRY | Age: 79
Setting detail: THERAPIES SERIES
Discharge: HOME | End: 2024-03-11
Payer: MEDICARE

## 2024-03-11 PROCEDURE — 90868 TCRANIAL MAGN STIM TX DELI: CPT

## 2024-03-11 NOTE — PROGRESS NOTES
Transcranial Magnetic Stimulation Procedure    Name: Leah Dorado    Date: 3/11/2024    Treatment: 30/35    Procedure Performed: Transcranial Magnetic Stimulation (TMS)    Procedure Detail: The optimal treatment parameters as determined during the initial Motor Threshold TMS device. The patient was positioned in the TMS treatment chair. The Magstim TMS treatment coil was placed at the optimal treatment location as determined during the initial Motor Threshold and Treatment Location Determination Procedure, and the treatment parameters for the patient were confirmed. The Magstim TMS Treatment was delivered. Patient was monitored during the TMS treatment.    Subjective:  Dose at which the procedure was performed: 100  Patient's response before and after the procedure: Tolerating TMS treatment without issue, no side effects voiced. Mood and affect bright. Decrease in depression and anxiety, motivation increasing. 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   Patient to return on 3/12/2024      Signed: Ivania Granados LPN, 3/11/2024

## 2024-03-12 ENCOUNTER — HOSPITAL ENCOUNTER (OUTPATIENT)
Dept: PSYCHIATRY | Age: 79
Setting detail: THERAPIES SERIES
Discharge: HOME | End: 2024-03-12
Payer: MEDICARE

## 2024-03-12 PROCEDURE — 90868 TCRANIAL MAGN STIM TX DELI: CPT

## 2024-03-12 NOTE — PROGRESS NOTES
Transcranial Magnetic Stimulation Procedure    Name: Leah Dorado    Date: 3/12/2024    Treatment: 31/35    Procedure Performed: Transcranial Magnetic Stimulation (TMS)    Procedure Detail: The optimal treatment parameters as determined during the initial Motor Threshold TMS device. The patient was positioned in the TMS treatment chair. The Magstim TMS treatment coil was placed at the optimal treatment location as determined during the initial Motor Threshold and Treatment Location Determination Procedure, and the treatment parameters for the patient were confirmed. The Magstim TMS Treatment was delivered. Patient was monitored during the TMS treatment.    Subjective:  Dose at which the procedure was performed: 100  Patient's response before and after the procedure: Tolerating TMS treatment without issue, no side effects voiced. Mood and affect bright. Looking forward to nicer weather and able to go out and enjoy it. Continue to work part time at [employer] and finding able to cope with stressors there easier. 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   Patient to return on 3/13/2024      Signed: Ivania Granados LPN, 3/12/2024

## 2024-03-13 ENCOUNTER — HOSPITAL ENCOUNTER (OUTPATIENT)
Dept: PSYCHIATRY | Age: 79
Setting detail: THERAPIES SERIES
Discharge: HOME | End: 2024-03-13
Payer: MEDICARE

## 2024-03-13 PROCEDURE — 90868 TCRANIAL MAGN STIM TX DELI: CPT

## 2024-03-13 NOTE — PROGRESS NOTES
Transcranial Magnetic Stimulation Procedure    Name: Leah Dorado    Date: 3/13/2024    Treatment: 32/35    Procedure Performed: Transcranial Magnetic Stimulation (TMS)    Procedure Detail: The optimal treatment parameters as determined during the initial Motor Threshold TMS device. The patient was positioned in the TMS treatment chair. The Magstim TMS treatment coil was placed at the optimal treatment location as determined during the initial Motor Threshold and Treatment Location Determination Procedure, and the treatment parameters for the patient were confirmed. The Magstim TMS Treatment was delivered. Patient was monitored during the TMS treatment.    Subjective:  Dose at which the procedure was performed: 100  Patient's response before and after the procedure: Tolerating TMS treatment without issue, no side effects voiced. Patient bright and pleasant. Satisfied with the great results from TMS, \"didn't know I could feel so good, I was used to feeling like before (depressed/anxiety)\" Making plans to work on outdoor projects and working more hours at [employer]. Will continue to offer supports as needed, Monitor during TMS treatments, and track efficacy with PHQ 9.    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   Patient to return on 3/18/2024      Signed: Ivania Granados LPN, 3/13/2024

## 2024-03-18 ENCOUNTER — HOSPITAL ENCOUNTER (OUTPATIENT)
Dept: PSYCHIATRY | Age: 79
Setting detail: THERAPIES SERIES
Discharge: HOME | End: 2024-03-18
Payer: MEDICARE

## 2024-03-18 PROCEDURE — 90868 TCRANIAL MAGN STIM TX DELI: CPT

## 2024-03-18 NOTE — PROGRESS NOTES
Transcranial Magnetic Stimulation Procedure    Name: Leah Dorado    Date: 3/18/2024    Treatment: 33/35    Procedure Performed: Transcranial Magnetic Stimulation (TMS)    Procedure Detail: The optimal treatment parameters as determined during the initial Motor Threshold TMS device. The patient was positioned in the TMS treatment chair. The Magstim TMS treatment coil was placed at the optimal treatment location as determined during the initial Motor Threshold and Treatment Location Determination Procedure, and the treatment parameters for the patient were confirmed. The Magstim TMS Treatment was delivered. Patient was monitored during the TMS treatment.    Subjective:  Dose at which the procedure was performed: 100  Patient's response before and after the procedure: Tolerating TMS treatment without issues, no side effects noted. Patient voicing \"feeling more, like I care more about things that bother me, I used to not want to cause an argument and just not say anything but now I want to\" Depression and Anxiety have improved and overall satisfied with results. 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   Patient to return on 3/19/2024      Signed: Ivania Granados LPN, 3/18/2024

## 2024-03-19 ENCOUNTER — HOSPITAL ENCOUNTER (OUTPATIENT)
Dept: PSYCHIATRY | Age: 79
Setting detail: THERAPIES SERIES
Discharge: HOME | End: 2024-03-19
Payer: MEDICARE

## 2024-03-19 PROCEDURE — 99213 OFFICE O/P EST LOW 20 MIN: CPT | Performed by: PSYCHIATRY & NEUROLOGY

## 2024-03-19 PROCEDURE — 90868 TCRANIAL MAGN STIM TX DELI: CPT

## 2024-03-19 ASSESSMENT — PATIENT HEALTH QUESTIONNAIRE - PHQ9
3. TROUBLE FALLING OR STAYING ASLEEP: SEVERAL DAYS
6. FEELING BAD ABOUT YOURSELF - OR THAT YOU ARE A FAILURE OR HAVE LET YOURSELF OR YOUR FAMILY DOWN: NOT AT ALL
2. FEELING DOWN, DEPRESSED OR HOPELESS: NOT AT ALL
SUM OF ALL RESPONSES TO PHQ QUESTIONS 1-9: 3
9. THOUGHTS THAT YOU WOULD BE BETTER OFF DEAD, OR OF HURTING YOURSELF: NOT AT ALL
4. FEELING TIRED OR HAVING LITTLE ENERGY: SEVERAL DAYS
SUM OF ALL RESPONSES TO PHQ9 QUESTIONS 1 & 2: 0
10. IF YOU CHECKED OFF ANY PROBLEMS, HOW DIFFICULT HAVE THESE PROBLEMS MADE IT FOR YOU TO DO YOUR WORK, TAKE CARE OF THINGS AT HOME, OR GET ALONG WITH OTHER PEOPLE: NOT DIFFICULT AT ALL
1. LITTLE INTEREST OR PLEASURE IN DOING THINGS: NOT AT ALL
5. POOR APPETITE OR OVEREATING: SEVERAL DAYS
SUM OF ALL RESPONSES TO PHQ QUESTIONS 1-9: 3
7. TROUBLE CONCENTRATING ON THINGS, SUCH AS READING THE NEWSPAPER OR WATCHING TELEVISION: NOT AT ALL
8. MOVING OR SPEAKING SO SLOWLY THAT OTHER PEOPLE COULD HAVE NOTICED. OR THE OPPOSITE, BEING SO FIGETY OR RESTLESS THAT YOU HAVE BEEN MOVING AROUND A LOT MORE THAN USUAL: NOT AT ALL
SUM OF ALL RESPONSES TO PHQ QUESTIONS 1-9: 3
SUM OF ALL RESPONSES TO PHQ QUESTIONS 1-9: 3

## 2024-03-19 NOTE — PROGRESS NOTES
Transcranial Magnetic Stimulation Procedure    Name: Leah Dorado    Date: 3/19/2024    Treatment: 34/35    Procedure Performed: Transcranial Magnetic Stimulation (TMS)    Procedure Detail: The optimal treatment parameters as determined during the initial Motor Threshold TMS device. The patient was positioned in the TMS treatment chair. The Magstim TMS treatment coil was placed at the optimal treatment location as determined during the initial Motor Threshold and Treatment Location Determination Procedure, and the treatment parameters for the patient were confirmed. The Magstim TMS Treatment was delivered. Patient was monitored during the TMS treatment.    Subjective:  Dose at which the procedure was performed: 100  Patient's response before and after the procedure: Tolerating TMS treatment without issue, no side effects voiced. Depression and Anxiety have improved since starting TMS and satisfied with the results. Working 4 days at [employer] this week and able to cope with work stressors better since TMS. Motivation and energy level increased and making plans/goals for the future. 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   Patient to return on 3/25/2024      Signed: Ivania Granados LPN, 3/19/2024

## 2024-03-19 NOTE — PROGRESS NOTES
PSYCHIATRY ATTENDING NOTE    CC: \"I feel really good.\"    S: Patient being seen at outpatient clinic in follow-up for depression and anxiety. Met with patient to discuss progress with TMS therapy.    Leah presents in good spirits  Has completed 34 of 35 sessions  PHQ-9 score down to just 3 (originally 18)  Patient much more functional overall  Working-part time at [employer], socializing with friends  Anxiety much improved, not taking any Klonopin  Eating and sleeping well, mood stable  No acute issues or concerns    MSE: White female appears age. Pleasant, cooperative, forthcoming. Normal psychomotor activity, gait, strength, tone, eye contact. Mood euthymic. Affect flexible. Speech clear. Thought process organized. Content future-oriented. No suicidal or homicidal ideations. No paranoia, delusions, hallucinations. Orientation, concentration, recent and remote memory are grossly intact. Fund of knowledge fair. Language use fair. Insight and judgment fair.      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 She is scheduled for just one more session on Monday which will be #35. Considering how well she is doing we discussed not doing any scheduled maintenance sessions for now; however, if patient starts to notice symptoms increasing again we can easily revisit this. Patient will continue to follow with Dr. Velasquez for medication management. Patient to call us as needed if any issues.                           Electronically signed by Humza Perez MD on 3/19/2024 at 10:40 AM

## 2024-03-25 ENCOUNTER — HOSPITAL ENCOUNTER (OUTPATIENT)
Dept: PSYCHIATRY | Age: 79
Setting detail: THERAPIES SERIES
Discharge: HOME | End: 2024-03-25
Payer: MEDICARE

## 2024-03-25 PROCEDURE — 90868 TCRANIAL MAGN STIM TX DELI: CPT

## 2024-03-25 NOTE — PROGRESS NOTES
Transcranial Magnetic Stimulation Procedure    Name: Leah Dorado    Date: 3/25/2024    Treatment: 35/35    Procedure Performed: Transcranial Magnetic Stimulation (TMS)    Procedure Detail: The optimal treatment parameters as determined during the initial Motor Threshold TMS device. The patient was positioned in the TMS treatment chair. The Magstim TMS treatment coil was placed at the optimal treatment location as determined during the initial Motor Threshold and Treatment Location Determination Procedure, and the treatment parameters for the patient were confirmed. The Magstim TMS Treatment was delivered. Patient was monitored during the TMS treatment.    Subjective:  Dose at which the procedure was performed: 100  Patient's response before and after the procedure: Tolerated TMS treatment without issue, no side effects voiced. Mood and affect bright. Depression and Anxiety has improved. Motivation and energy level increased and making future plans. Will follow up with PHQ 9 via mail. Patient will contact dept if change in depression or anxiety status and interested in maintenance tx.    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   Patient to return on last tx.      Signed: Ivnaia Granados LPN, 3/25/2024

## 2024-07-02 ENCOUNTER — HOSPITAL ENCOUNTER (OUTPATIENT)
Age: 79
Discharge: HOME | End: 2024-07-02
Payer: MEDICARE

## 2024-07-02 DIAGNOSIS — R53.82 CHRONIC FATIGUE: ICD-10-CM

## 2024-07-02 DIAGNOSIS — R53.83 FATIGUE, UNSPECIFIED TYPE: ICD-10-CM

## 2024-07-02 DIAGNOSIS — E78.00 PURE HYPERCHOLESTEROLEMIA: ICD-10-CM

## 2024-07-02 LAB
ALBUMIN SERPL-MCNC: 4.4 G/DL (ref 3.5–5.2)
ALP SERPL-CCNC: 82 U/L (ref 35–104)
ALT SERPL-CCNC: 15 U/L (ref 0–32)
ANION GAP SERPL CALCULATED.3IONS-SCNC: 9 MMOL/L (ref 7–16)
AST SERPL-CCNC: 19 U/L (ref 0–31)
BILIRUB SERPL-MCNC: 0.4 MG/DL (ref 0–1.2)
BUN SERPL-MCNC: 20 MG/DL (ref 6–23)
CALCIUM SERPL-MCNC: 9.6 MG/DL (ref 8.6–10.2)
CHLORIDE SERPL-SCNC: 104 MMOL/L (ref 98–107)
CHOLEST SERPL-MCNC: 136 MG/DL
CO2 SERPL-SCNC: 28 MMOL/L (ref 22–29)
CREAT SERPL-MCNC: 0.9 MG/DL (ref 0.5–1)
GFR, ESTIMATED: 65 ML/MIN/1.73M2
GLUCOSE SERPL-MCNC: 99 MG/DL (ref 74–99)
HDLC SERPL-MCNC: 48 MG/DL
LDLC SERPL CALC-MCNC: 44 MG/DL
POTASSIUM SERPL-SCNC: 4.4 MMOL/L (ref 3.5–5)
PROT SERPL-MCNC: 7.3 G/DL (ref 6.4–8.3)
SODIUM SERPL-SCNC: 141 MMOL/L (ref 132–146)
TRIGL SERPL-MCNC: 219 MG/DL
TSH SERPL DL<=0.05 MIU/L-ACNC: 2.01 UIU/ML (ref 0.27–4.2)
VIT B12 SERPL-MCNC: 1601 PG/ML (ref 211–946)
VLDLC SERPL CALC-MCNC: 44 MG/DL

## 2024-07-02 PROCEDURE — 36415 COLL VENOUS BLD VENIPUNCTURE: CPT

## 2024-07-02 PROCEDURE — 80053 COMPREHEN METABOLIC PANEL: CPT

## 2024-07-02 PROCEDURE — 82607 VITAMIN B-12: CPT

## 2024-07-02 PROCEDURE — 80061 LIPID PANEL: CPT

## 2024-07-02 PROCEDURE — 84443 ASSAY THYROID STIM HORMONE: CPT

## 2024-07-04 ENCOUNTER — HOSPITAL ENCOUNTER (EMERGENCY)
Age: 79
Discharge: HOME | End: 2024-07-04
Attending: EMERGENCY MEDICINE
Payer: MEDICARE

## 2024-07-04 ENCOUNTER — APPOINTMENT (OUTPATIENT)
Dept: CT IMAGING | Age: 79
End: 2024-07-04
Payer: MEDICARE

## 2024-07-04 ENCOUNTER — HOSPITAL ENCOUNTER (EMERGENCY)
Age: 79
Discharge: LEFT BEFORE DISCHARGE | End: 2024-07-04
Attending: EMERGENCY MEDICINE

## 2024-07-04 VITALS
HEART RATE: 59 BPM | SYSTOLIC BLOOD PRESSURE: 155 MMHG | OXYGEN SATURATION: 98 % | TEMPERATURE: 97.7 F | WEIGHT: 145 LBS | RESPIRATION RATE: 20 BRPM | BODY MASS INDEX: 24.75 KG/M2 | HEIGHT: 64 IN | DIASTOLIC BLOOD PRESSURE: 56 MMHG

## 2024-07-04 DIAGNOSIS — K57.92 ACUTE DIVERTICULITIS: Primary | ICD-10-CM

## 2024-07-04 LAB
ALBUMIN SERPL-MCNC: 4.3 G/DL (ref 3.5–5.2)
ALP SERPL-CCNC: 82 U/L (ref 35–104)
ALT SERPL-CCNC: 24 U/L (ref 0–32)
ANION GAP SERPL CALCULATED.3IONS-SCNC: 10 MMOL/L (ref 7–16)
AST SERPL-CCNC: 26 U/L (ref 0–31)
BASOPHILS # BLD: 0.05 K/UL (ref 0–0.2)
BASOPHILS NFR BLD: 1 % (ref 0–2)
BILIRUB DIRECT SERPL-MCNC: <0.2 MG/DL (ref 0–0.3)
BILIRUB INDIRECT SERPL-MCNC: NORMAL MG/DL (ref 0–1)
BILIRUB SERPL-MCNC: 0.4 MG/DL (ref 0–1.2)
BILIRUB UR QL STRIP: NEGATIVE
BUN SERPL-MCNC: 18 MG/DL (ref 6–23)
CALCIUM SERPL-MCNC: 9.3 MG/DL (ref 8.6–10.2)
CHLORIDE SERPL-SCNC: 103 MMOL/L (ref 98–107)
CLARITY UR: CLEAR
CO2 SERPL-SCNC: 27 MMOL/L (ref 22–29)
COLOR UR: YELLOW
CREAT SERPL-MCNC: 0.8 MG/DL (ref 0.5–1)
EOSINOPHIL # BLD: 0.25 K/UL (ref 0.05–0.5)
EOSINOPHILS RELATIVE PERCENT: 5 % (ref 0–6)
ERYTHROCYTE [DISTWIDTH] IN BLOOD BY AUTOMATED COUNT: 15.9 % (ref 11.5–15)
GFR, ESTIMATED: 74 ML/MIN/1.73M2
GLUCOSE SERPL-MCNC: 105 MG/DL (ref 74–99)
GLUCOSE UR STRIP-MCNC: NEGATIVE MG/DL
HCT VFR BLD AUTO: 38.9 % (ref 34–48)
HGB BLD-MCNC: 12.9 G/DL (ref 11.5–15.5)
HGB UR QL STRIP.AUTO: NEGATIVE
IMM GRANULOCYTES # BLD AUTO: <0.03 K/UL (ref 0–0.58)
IMM GRANULOCYTES NFR BLD: 0 % (ref 0–5)
KETONES UR STRIP-MCNC: NEGATIVE MG/DL
LACTATE BLDV-SCNC: 1 MMOL/L (ref 0.5–2.2)
LEUKOCYTE ESTERASE UR QL STRIP: NEGATIVE
LIPASE SERPL-CCNC: 31 U/L (ref 13–60)
LYMPHOCYTES NFR BLD: 1.98 K/UL (ref 1.5–4)
LYMPHOCYTES RELATIVE PERCENT: 39 % (ref 20–42)
MCH RBC QN AUTO: 30.9 PG (ref 26–35)
MCHC RBC AUTO-ENTMCNC: 33.2 G/DL (ref 32–34.5)
MCV RBC AUTO: 93.1 FL (ref 80–99.9)
MONOCYTES NFR BLD: 0.58 K/UL (ref 0.1–0.95)
MONOCYTES NFR BLD: 12 % (ref 2–12)
NEUTROPHILS NFR BLD: 43 % (ref 43–80)
NEUTS SEG NFR BLD: 2.15 K/UL (ref 1.8–7.3)
NITRITE UR QL STRIP: NEGATIVE
PH UR STRIP: 6.5 [PH] (ref 5–9)
PLATELET # BLD AUTO: 198 K/UL (ref 130–450)
PMV BLD AUTO: 11.9 FL (ref 7–12)
POTASSIUM SERPL-SCNC: 4.7 MMOL/L (ref 3.5–5)
PROT SERPL-MCNC: 7.2 G/DL (ref 6.4–8.3)
PROT UR STRIP-MCNC: NEGATIVE MG/DL
RBC # BLD AUTO: 4.18 M/UL (ref 3.5–5.5)
RBC #/AREA URNS HPF: NORMAL /HPF
SODIUM SERPL-SCNC: 140 MMOL/L (ref 132–146)
SP GR UR STRIP: 1.02 (ref 1–1.03)
UROBILINOGEN UR STRIP-ACNC: 0.2 EU/DL (ref 0–1)
WBC #/AREA URNS HPF: NORMAL /HPF
WBC OTHER # BLD: 5 K/UL (ref 4.5–11.5)

## 2024-07-04 PROCEDURE — 82248 BILIRUBIN DIRECT: CPT

## 2024-07-04 PROCEDURE — 83690 ASSAY OF LIPASE: CPT

## 2024-07-04 PROCEDURE — 81001 URINALYSIS AUTO W/SCOPE: CPT

## 2024-07-04 PROCEDURE — 85025 COMPLETE CBC W/AUTO DIFF WBC: CPT

## 2024-07-04 PROCEDURE — 80053 COMPREHEN METABOLIC PANEL: CPT

## 2024-07-04 PROCEDURE — 83605 ASSAY OF LACTIC ACID: CPT

## 2024-07-04 PROCEDURE — 6370000000 HC RX 637 (ALT 250 FOR IP): Performed by: EMERGENCY MEDICINE

## 2024-07-04 PROCEDURE — 99285 EMERGENCY DEPT VISIT HI MDM: CPT

## 2024-07-04 PROCEDURE — 6360000004 HC RX CONTRAST MEDICATION: Performed by: RADIOLOGY

## 2024-07-04 PROCEDURE — 74177 CT ABD & PELVIS W/CONTRAST: CPT

## 2024-07-04 RX ORDER — HYDROCODONE BITARTRATE AND ACETAMINOPHEN 5; 325 MG/1; MG/1
1 TABLET ORAL ONCE
Status: COMPLETED | OUTPATIENT
Start: 2024-07-04 | End: 2024-07-04

## 2024-07-04 RX ORDER — METRONIDAZOLE 500 MG/1
500 TABLET ORAL 3 TIMES DAILY
Qty: 30 TABLET | Refills: 0 | Status: SHIPPED | OUTPATIENT
Start: 2024-07-04 | End: 2024-07-14

## 2024-07-04 RX ORDER — CEFDINIR 300 MG/1
300 CAPSULE ORAL 2 TIMES DAILY
Qty: 20 CAPSULE | Refills: 0 | Status: SHIPPED | OUTPATIENT
Start: 2024-07-04 | End: 2024-07-14

## 2024-07-04 RX ORDER — CEFDINIR 300 MG/1
300 CAPSULE ORAL ONCE
Status: COMPLETED | OUTPATIENT
Start: 2024-07-04 | End: 2024-07-04

## 2024-07-04 RX ORDER — METRONIDAZOLE 500 MG/1
500 TABLET ORAL ONCE
Status: COMPLETED | OUTPATIENT
Start: 2024-07-04 | End: 2024-07-04

## 2024-07-04 RX ORDER — HYDROCODONE BITARTRATE AND ACETAMINOPHEN 5; 325 MG/1; MG/1
1 TABLET ORAL EVERY 4 HOURS PRN
Qty: 12 TABLET | Refills: 0 | Status: SHIPPED | OUTPATIENT
Start: 2024-07-04 | End: 2024-07-07

## 2024-07-04 RX ADMIN — CEFDINIR 300 MG: 300 CAPSULE ORAL at 08:17

## 2024-07-04 RX ADMIN — HYDROCODONE BITARTRATE AND ACETAMINOPHEN 1 TABLET: 5; 325 TABLET ORAL at 08:17

## 2024-07-04 RX ADMIN — METRONIDAZOLE 500 MG: 500 TABLET ORAL at 08:18

## 2024-07-04 RX ADMIN — IOPAMIDOL 75 ML: 755 INJECTION, SOLUTION INTRAVENOUS at 07:10

## 2024-07-04 ASSESSMENT — PAIN SCALES - GENERAL: PAINLEVEL_OUTOF10: 9

## 2024-07-04 ASSESSMENT — LIFESTYLE VARIABLES
HOW MANY STANDARD DRINKS CONTAINING ALCOHOL DO YOU HAVE ON A TYPICAL DAY: PATIENT DOES NOT DRINK
HOW OFTEN DO YOU HAVE A DRINK CONTAINING ALCOHOL: NEVER

## 2024-07-04 ASSESSMENT — PAIN DESCRIPTION - LOCATION: LOCATION: ABDOMEN

## 2024-07-04 NOTE — ED PROVIDER NOTES
ED Course User Index  [CD] Ernie Brar MD       Medical Decision Making  uncomplicated diverticulitis.  Labs are reassuring and essentially normal.  CAT scan does not show any abscess or perforation.  She is well-appearing and nontoxic.  She wants to try outpatient therapy which I think is completely reasonable.  Advised her if her symptoms worsen or anything changes she needs to return immediately.  She was given prescriptions for antibiotics    Problems Addressed:  Acute diverticulitis: acute illness or injury    Amount and/or Complexity of Data Reviewed  External Data Reviewed: notes.  Labs: ordered. Decision-making details documented in ED Course.  Radiology: ordered. Decision-making details documented in ED Course.    Risk  Prescription drug management.              CONSULTS:   None        PROCEDURES   Unless otherwise noted below, none       CRITICAL CARE TIME (.cct)         I, Dr. Brar, am the primary provider of record    FINAL IMPRESSION      1. Acute diverticulitis          DISPOSITION/PLAN     DISPOSITION Decision To Discharge 07/04/2024 08:10:22 AM      PATIENT REFERRED TO:  Zoran Velasquez MD  [address] [facility]  [address] [place] [address]  [phone]    Schedule an appointment as soon as possible for a visit       [facility] Emergency Department  [address] [address]  [phone]    If symptoms worsen      DISCHARGE MEDICATIONS:  Discharge Medication List as of 7/4/2024  8:20 AM        START taking these medications    Details   cefdinir (OMNICEF) 300 MG capsule Take 1 capsule by mouth 2 times daily for 10 days, Disp-20 capsule, R-0Normal      metroNIDAZOLE (FLAGYL) 500 MG tablet Take 1 tablet by mouth 3 times daily for 10 days, Disp-30 tablet, R-0Normal      HYDROcodone-acetaminophen (NORCO) 5-325 MG per tablet Take 1 tablet by mouth every 4 hours as needed for Pain for up to 3 days. Intended supply: 3 days. Take lowest dose

## 2024-07-12 ENCOUNTER — APPOINTMENT (OUTPATIENT)
Dept: CT IMAGING | Age: 79
End: 2024-07-12
Payer: MEDICARE

## 2024-07-12 ENCOUNTER — HOSPITAL ENCOUNTER (EMERGENCY)
Age: 79
Discharge: HOME | End: 2024-07-12
Attending: STUDENT IN AN ORGANIZED HEALTH CARE EDUCATION/TRAINING PROGRAM
Payer: MEDICARE

## 2024-07-12 VITALS
WEIGHT: 147.2 LBS | RESPIRATION RATE: 16 BRPM | HEIGHT: 64 IN | SYSTOLIC BLOOD PRESSURE: 140 MMHG | BODY MASS INDEX: 25.13 KG/M2 | TEMPERATURE: 97.7 F | OXYGEN SATURATION: 97 % | HEART RATE: 67 BPM | DIASTOLIC BLOOD PRESSURE: 62 MMHG

## 2024-07-12 DIAGNOSIS — N13.30 HYDRONEPHROSIS, UNSPECIFIED HYDRONEPHROSIS TYPE: ICD-10-CM

## 2024-07-12 DIAGNOSIS — R10.12 LEFT UPPER QUADRANT ABDOMINAL PAIN: Primary | ICD-10-CM

## 2024-07-12 DIAGNOSIS — R11.0 NAUSEA: ICD-10-CM

## 2024-07-12 DIAGNOSIS — M51.36 DEGENERATIVE DISC DISEASE, LUMBAR: ICD-10-CM

## 2024-07-12 LAB
ALBUMIN SERPL-MCNC: 4.1 G/DL (ref 3.5–5.2)
ALP SERPL-CCNC: 72 U/L (ref 35–104)
ALT SERPL-CCNC: 40 U/L (ref 0–32)
ANION GAP SERPL CALCULATED.3IONS-SCNC: 9 MMOL/L (ref 7–16)
AST SERPL-CCNC: 42 U/L (ref 0–31)
BASOPHILS # BLD: 0.04 K/UL (ref 0–0.2)
BASOPHILS NFR BLD: 1 % (ref 0–2)
BILIRUB SERPL-MCNC: 0.3 MG/DL (ref 0–1.2)
BILIRUB UR QL STRIP: NEGATIVE
BUN SERPL-MCNC: 15 MG/DL (ref 6–23)
CALCIUM SERPL-MCNC: 8.8 MG/DL (ref 8.6–10.2)
CHLORIDE SERPL-SCNC: 104 MMOL/L (ref 98–107)
CLARITY UR: CLEAR
CO2 SERPL-SCNC: 26 MMOL/L (ref 22–29)
COLOR UR: YELLOW
CREAT SERPL-MCNC: 0.9 MG/DL (ref 0.5–1)
EKG ATRIAL RATE: 66 BPM
EKG P AXIS: 59 DEGREES
EKG P-R INTERVAL: 140 MS
EKG Q-T INTERVAL: 444 MS
EKG QRS DURATION: 86 MS
EKG QTC CALCULATION (BAZETT): 465 MS
EKG R AXIS: 49 DEGREES
EKG T AXIS: 65 DEGREES
EKG VENTRICULAR RATE: 66 BPM
EOSINOPHIL # BLD: 0.2 K/UL (ref 0.05–0.5)
EOSINOPHILS RELATIVE PERCENT: 3 % (ref 0–6)
ERYTHROCYTE [DISTWIDTH] IN BLOOD BY AUTOMATED COUNT: 16.2 % (ref 11.5–15)
GFR, ESTIMATED: 70 ML/MIN/1.73M2
GLUCOSE SERPL-MCNC: 121 MG/DL (ref 74–99)
GLUCOSE UR STRIP-MCNC: NEGATIVE MG/DL
HCT VFR BLD AUTO: 35.9 % (ref 34–48)
HGB BLD-MCNC: 11.9 G/DL (ref 11.5–15.5)
HGB UR QL STRIP.AUTO: NEGATIVE
IMM GRANULOCYTES # BLD AUTO: <0.03 K/UL (ref 0–0.58)
IMM GRANULOCYTES NFR BLD: 0 % (ref 0–5)
KETONES UR STRIP-MCNC: NEGATIVE MG/DL
LACTATE BLDV-SCNC: 0.9 MMOL/L (ref 0.5–2.2)
LEUKOCYTE ESTERASE UR QL STRIP: NEGATIVE
LIPASE SERPL-CCNC: 24 U/L (ref 13–60)
LYMPHOCYTES NFR BLD: 2.01 K/UL (ref 1.5–4)
LYMPHOCYTES RELATIVE PERCENT: 34 % (ref 20–42)
MCH RBC QN AUTO: 30.2 PG (ref 26–35)
MCHC RBC AUTO-ENTMCNC: 33.1 G/DL (ref 32–34.5)
MCV RBC AUTO: 91.1 FL (ref 80–99.9)
MONOCYTES NFR BLD: 0.68 K/UL (ref 0.1–0.95)
MONOCYTES NFR BLD: 11 % (ref 2–12)
NEUTROPHILS NFR BLD: 51 % (ref 43–80)
NEUTS SEG NFR BLD: 3.04 K/UL (ref 1.8–7.3)
NITRITE UR QL STRIP: NEGATIVE
PH UR STRIP: 5.5 [PH] (ref 5–9)
PLATELET # BLD AUTO: 207 K/UL (ref 130–450)
PMV BLD AUTO: 11.7 FL (ref 7–12)
POTASSIUM SERPL-SCNC: 4.1 MMOL/L (ref 3.5–5)
PROT SERPL-MCNC: 6.4 G/DL (ref 6.4–8.3)
PROT UR STRIP-MCNC: NEGATIVE MG/DL
RBC # BLD AUTO: 3.94 M/UL (ref 3.5–5.5)
RBC #/AREA URNS HPF: NORMAL /HPF
SODIUM SERPL-SCNC: 139 MMOL/L (ref 132–146)
SP GR UR STRIP: 1.01 (ref 1–1.03)
UROBILINOGEN UR STRIP-ACNC: 0.2 EU/DL (ref 0–1)
WBC #/AREA URNS HPF: NORMAL /HPF
WBC OTHER # BLD: 6 K/UL (ref 4.5–11.5)

## 2024-07-12 PROCEDURE — 85025 COMPLETE CBC W/AUTO DIFF WBC: CPT

## 2024-07-12 PROCEDURE — 83690 ASSAY OF LIPASE: CPT

## 2024-07-12 PROCEDURE — 93005 ELECTROCARDIOGRAM TRACING: CPT | Performed by: STUDENT IN AN ORGANIZED HEALTH CARE EDUCATION/TRAINING PROGRAM

## 2024-07-12 PROCEDURE — 99285 EMERGENCY DEPT VISIT HI MDM: CPT

## 2024-07-12 PROCEDURE — 96375 TX/PRO/DX INJ NEW DRUG ADDON: CPT

## 2024-07-12 PROCEDURE — 96374 THER/PROPH/DIAG INJ IV PUSH: CPT

## 2024-07-12 PROCEDURE — 93010 ELECTROCARDIOGRAM REPORT: CPT | Performed by: INTERNAL MEDICINE

## 2024-07-12 PROCEDURE — 74177 CT ABD & PELVIS W/CONTRAST: CPT

## 2024-07-12 PROCEDURE — 6360000002 HC RX W HCPCS: Performed by: STUDENT IN AN ORGANIZED HEALTH CARE EDUCATION/TRAINING PROGRAM

## 2024-07-12 PROCEDURE — 80053 COMPREHEN METABOLIC PANEL: CPT

## 2024-07-12 PROCEDURE — 6360000004 HC RX CONTRAST MEDICATION: Performed by: RADIOLOGY

## 2024-07-12 PROCEDURE — 83605 ASSAY OF LACTIC ACID: CPT

## 2024-07-12 PROCEDURE — 81001 URINALYSIS AUTO W/SCOPE: CPT

## 2024-07-12 RX ORDER — ONDANSETRON 4 MG/1
4 TABLET, ORALLY DISINTEGRATING ORAL 3 TIMES DAILY PRN
Qty: 15 TABLET | Refills: 0 | Status: SHIPPED | OUTPATIENT
Start: 2024-07-12 | End: 2024-07-17

## 2024-07-12 RX ORDER — ONDANSETRON 2 MG/ML
4 INJECTION INTRAMUSCULAR; INTRAVENOUS ONCE
Status: COMPLETED | OUTPATIENT
Start: 2024-07-12 | End: 2024-07-12

## 2024-07-12 RX ORDER — MORPHINE SULFATE 4 MG/ML
4 INJECTION, SOLUTION INTRAMUSCULAR; INTRAVENOUS ONCE
Status: COMPLETED | OUTPATIENT
Start: 2024-07-12 | End: 2024-07-12

## 2024-07-12 RX ORDER — OXYCODONE HYDROCHLORIDE 5 MG/1
5 TABLET ORAL EVERY 6 HOURS PRN
Qty: 12 TABLET | Refills: 0 | Status: SHIPPED | OUTPATIENT
Start: 2024-07-12 | End: 2024-07-15

## 2024-07-12 RX ADMIN — MORPHINE SULFATE 4 MG: 4 INJECTION, SOLUTION INTRAMUSCULAR; INTRAVENOUS at 14:53

## 2024-07-12 RX ADMIN — IOPAMIDOL 75 ML: 755 INJECTION, SOLUTION INTRAVENOUS at 15:25

## 2024-07-12 RX ADMIN — ONDANSETRON 4 MG: 2 INJECTION INTRAMUSCULAR; INTRAVENOUS at 14:51

## 2024-07-12 ASSESSMENT — ENCOUNTER SYMPTOMS
ABDOMINAL DISTENTION: 0
SHORTNESS OF BREATH: 0
COUGH: 0
NAUSEA: 1
PHOTOPHOBIA: 0
ABDOMINAL PAIN: 1
CHEST TIGHTNESS: 0
VOMITING: 0
DIARRHEA: 0

## 2024-07-12 ASSESSMENT — PAIN DESCRIPTION - DESCRIPTORS
DESCRIPTORS: ACHING
DESCRIPTORS: DISCOMFORT;ACHING;SHARP

## 2024-07-12 ASSESSMENT — PAIN DESCRIPTION - LOCATION
LOCATION: ABDOMEN
LOCATION: ABDOMEN

## 2024-07-12 ASSESSMENT — PAIN DESCRIPTION - ONSET: ONSET: ON-GOING

## 2024-07-12 ASSESSMENT — PAIN SCALES - GENERAL
PAINLEVEL_OUTOF10: 8
PAINLEVEL_OUTOF10: 10

## 2024-07-12 ASSESSMENT — PAIN DESCRIPTION - ORIENTATION: ORIENTATION: LEFT

## 2024-07-12 ASSESSMENT — PAIN DESCRIPTION - FREQUENCY: FREQUENCY: CONTINUOUS

## 2024-07-12 NOTE — ED PROVIDER NOTES
Leah Dorado is a 78-year-old female present emergency department concern for abdominal pain patient is having left upper quadrant abdominal pain patient was diagnosed with diverticulitis on July 4.  Patient was started on Omnicef and Flagyl.  Patient has 1 dose of antibiotics left patient had stated that she did follow-up with her primary care physician on July 9.  Patient continued to have pain at that time patient was told that if her symptoms do not improve by this time that she should present to emergency department for further evaluation.  Patient is having some nausea without vomiting patient states that symptoms have not worsened but have been constant without any improvement.  Patient denies chest pain, shortness of breath, fever, chills.    The history is provided by the patient, medical records and a relative.        Review of Systems   Constitutional:  Negative for chills, diaphoresis, fatigue and fever.   Eyes:  Negative for photophobia and visual disturbance.   Respiratory:  Negative for cough, chest tightness and shortness of breath.    Cardiovascular:  Negative for chest pain, palpitations and leg swelling.   Gastrointestinal:  Positive for abdominal pain and nausea. Negative for abdominal distention, diarrhea and vomiting.   Genitourinary:  Negative for dysuria.   Musculoskeletal:  Negative for neck pain and neck stiffness.   Skin:  Negative for pallor and rash.   Neurological:  Negative for headaches.   Psychiatric/Behavioral:  Negative for confusion.         Physical Exam  Vitals and nursing note reviewed.   Constitutional:       Appearance: She is not ill-appearing.   HENT:      Head: Normocephalic and atraumatic.   Eyes:      General: No scleral icterus.     Conjunctiva/sclera: Conjunctivae normal.      Pupils: Pupils are equal, round, and reactive to light.   Cardiovascular:      Rate and Rhythm: Normal rate and regular rhythm.   Pulmonary:      Effort: Pulmonary effort is normal.

## 2024-07-12 NOTE — DISCHARGE INSTRUCTIONS
FINDINGS:  Lower Chest: Visualized portion of the lower chest demonstrates no acute  abnormality.     Organs: In the liver there is no change in moderatedilatation of the  intrahepatic biliary system with no change in moderatedilatation of the  common bile duct extending through the pancreatic head consistent with post  cholecystectomy status.  The gallbladder is again seen to be absent from  cholecystectomy.     There is no change in the subcapsular cyst in the left lobe of the liver,  segment 4A.     The liver is otherwise normal in appearance.     The spleen and pancreas are unremarkable. The adrenal glands are unremarkable.     There is new mild right hydronephrosis and new mild right proximal  hydroureter extending to the crossing of the iliac vessels, with no sign of  calculus or mass.     There is slight increase in mild left hydronephrosis, with new mild proximal  left hydroureter extending to the crossing of the iliac vessels, with no sign  of calculus or mass.     The etiology of the new mild right hydronephrosis and slight worsening of  mild left hydronephrosis is uncertain and may simply be from previous  obstruction resulting in mildly patulous collecting systems.     GI/Bowel: There is no evidence of bowel obstruction. No evidence of abnormal  bowel wall thickening or distension.     The previously seen minimal diverticulitis of the proximal sigmoid colon has  resolved.  Again seen is moderate diverticulosis of the sigmoid colon with no  sign of additional diverticulitis.The appendix is not visualized, but there  is no sign of any inflammatory process in the area of the appendix.     Pelvis: The uterus is again seen to be absent consistent with hysterectomy.  The adnexal regions are normal in appearance for the patient's age. The  bladder is unremarkable in appearance. There is no sign of pelvic or inguinal  mass or adenopathy.     Peritoneum/Retroperitoneum: No evidence of ascites or free air. No

## 2024-07-17 ENCOUNTER — OFFICE VISIT (OUTPATIENT)
Dept: SURGERY | Age: 79
End: 2024-07-17
Payer: MEDICARE

## 2024-07-17 VITALS
DIASTOLIC BLOOD PRESSURE: 68 MMHG | OXYGEN SATURATION: 96 % | SYSTOLIC BLOOD PRESSURE: 129 MMHG | WEIGHT: 145 LBS | BODY MASS INDEX: 24.75 KG/M2 | HEIGHT: 64 IN | HEART RATE: 76 BPM | TEMPERATURE: 96.9 F

## 2024-07-17 DIAGNOSIS — R10.12 LEFT UPPER QUADRANT PAIN: Primary | ICD-10-CM

## 2024-07-17 DIAGNOSIS — K57.92 DIVERTICULITIS: ICD-10-CM

## 2024-07-17 PROCEDURE — 99204 OFFICE O/P NEW MOD 45 MIN: CPT | Performed by: SURGERY

## 2024-07-17 PROCEDURE — 1123F ACP DISCUSS/DSCN MKR DOCD: CPT | Performed by: SURGERY

## 2024-07-17 RX ORDER — GABAPENTIN 100 MG/1
100 CAPSULE ORAL 3 TIMES DAILY
Qty: 42 CAPSULE | Refills: 0 | Status: SHIPPED | OUTPATIENT
Start: 2024-07-17 | End: 2024-07-31

## 2024-07-17 NOTE — PROGRESS NOTES
[facility] Surgery Clinic Note    Assessment/Plan:      Diagnosis Orders   1. Left upper quadrant pain  gabapentin (NEURONTIN) 100 MG capsule    Symptoms seem more radicular/neuropathic in etiology than GI related however will check EGD.  Trial Neurontin      2. Diverticulitis      Resolved on imaging.  Will evaluate with colonoscopy. Recommend Metamucil daily            Return for Colonoscopy.      Chief Complaint   Patient presents with    New Patient    Diverticulitis     Diverticulitis, left side abd pain x2 months, nausea, constipation, diarrhea    Abdominal Pain       PCP: Zoran Velasquez MD    HPI: Leah Dorado is a 78 y.o. female who presents in consultation for diverticulitis.  This is her first episode.  She completed antibiotic course.  She had CT imaging from the ER which was reviewed.  She has had 2 CAT scans in the last 2 weeks.  On her followup, there are no significant inflammatory changes.  She notes alternating diarrhea and constipation but tends to constipation.  She takes over-the-counter stool softener.  She has 2 stools every couple of weeks.  He has no blood in the stool.  Her last colonoscopy was about 2 years ago with Dr. Ferguson that she says was normal.  She is her pain has not improved at all.  However she says her pain is in the left upper quadrant with radiation to the left shoulder blade inferiorly.  She denies any shingles outbreaks.  She has tried heat and ice as well.  She does do some fiber supplementation..       Past Medical History:   Diagnosis Date    Anxiety     Arthritis     Chronic headaches     Depression     Diverticulitis of colon 7/13/2018    Fatigue     Fibromyalgia     GERD (gastroesophageal reflux disease)     History of blood transfusion     Hyperlipidemia     Nausea & vomiting     Von Willebrand disease (HCC)        Past Surgical History:   Procedure Laterality Date    APPENDECTOMY      BACK SURGERY  2002    lumbar    CERVICAL FUSION  02/28/2021

## 2024-07-25 ENCOUNTER — PREP FOR PROCEDURE (OUTPATIENT)
Dept: SURGERY | Age: 79
End: 2024-07-25

## 2024-07-25 DIAGNOSIS — R10.12 LEFT UPPER QUADRANT PAIN: ICD-10-CM

## 2024-07-25 DIAGNOSIS — K57.92 DIVERTICULITIS: ICD-10-CM

## 2024-08-01 ENCOUNTER — TELEPHONE (OUTPATIENT)
Dept: SURGERY | Age: 79
End: 2024-08-01

## 2024-08-01 NOTE — TELEPHONE ENCOUNTER
Prior Authorization Form:      DEMOGRAPHICS:                     Patient Name:  Leah Dorado  Patient :  1945            Insurance:  Payor: [facility] MEDICARE / Plan: [facility] MEDICARE ADVANTAGE / Product Type: *No Product type* /   Insurance ID Number:    Payer/Plan Subscr  Sex Relation Sub. Ins. ID Effective Group Num   1. [facility] MEDICARE * BINH DORADO* 1945 Female Self [id number]                                   [address] [address]   2. [facility] MEDICARE BINH STAHL* 1945 Female Self [id number]                                   [address] [address]         DIAGNOSIS & PROCEDURE:                       Procedure/Operation: EGD, Colonoscopy           CPT Code: 04006, 81105    Diagnosis:  LUQ pain, Diverticulitis    ICD10 Code: R10.12, K57.92    Location:  [facility]    Surgeon:  Lina    SCHEDULING INFORMATION:                          Date: 10/11/24    Time: 11:00              Anesthesia:  LMAC                                                       Status:  Outpatient        Special Comments:         Electronically signed by Dawn Santillan MA on 2024 at 9:15 AM

## 2024-08-01 NOTE — TELEPHONE ENCOUNTER
Leah Dorado is scheduled for EGD and colonoscopy with Dr Mills on 10/11/24 at [facility]. Patient needs to be NPO after midnight the night before procedure. All surgery instructions were explained to the patient and a surgery letter was also mailed out. MA informed patient that PAT will also be calling to review pre-op instructions and medications. Patient verbalized understanding. Electronically signed by Dawn Santillna MA on 8/1/2024 at 9:14 AM

## 2024-08-15 ENCOUNTER — OFFICE VISIT (OUTPATIENT)
Dept: NEUROSURGERY | Age: 79
End: 2024-08-15
Payer: MEDICARE

## 2024-08-15 VITALS
HEIGHT: 64 IN | WEIGHT: 144.4 LBS | BODY MASS INDEX: 24.65 KG/M2 | DIASTOLIC BLOOD PRESSURE: 75 MMHG | SYSTOLIC BLOOD PRESSURE: 120 MMHG | HEART RATE: 73 BPM | OXYGEN SATURATION: 93 %

## 2024-08-15 DIAGNOSIS — M54.50 CHRONIC LEFT-SIDED LOW BACK PAIN WITHOUT SCIATICA: Primary | ICD-10-CM

## 2024-08-15 DIAGNOSIS — G89.29 CHRONIC LEFT-SIDED THORACIC BACK PAIN: ICD-10-CM

## 2024-08-15 DIAGNOSIS — G89.29 CHRONIC LEFT-SIDED LOW BACK PAIN WITHOUT SCIATICA: Primary | ICD-10-CM

## 2024-08-15 DIAGNOSIS — M54.6 CHRONIC LEFT-SIDED THORACIC BACK PAIN: ICD-10-CM

## 2024-08-15 PROCEDURE — 1123F ACP DISCUSS/DSCN MKR DOCD: CPT | Performed by: PHYSICIAN ASSISTANT

## 2024-08-15 PROCEDURE — 99203 OFFICE O/P NEW LOW 30 MIN: CPT | Performed by: PHYSICIAN ASSISTANT

## 2024-08-15 ASSESSMENT — ENCOUNTER SYMPTOMS
BACK PAIN: 1
SHORTNESS OF BREATH: 0
TROUBLE SWALLOWING: 0
ABDOMINAL PAIN: 1
PHOTOPHOBIA: 0

## 2024-08-15 NOTE — PROGRESS NOTES
[facility] Neurosurgery Outpatient Clinic      Subjective:  Leah Dorado is a 78 year old female with past medical history of L4-S1 laminectomy in 2002 at [facility] and cervical fusion by Dr. Wallace in 2021.     She presents to the office today as a new patient c/o left sided thoracic and lumbar pain. The pain radiates into her left upper quadrant of her abdomen. Describes the pain as sharp and stabbing. Pain has been present for approximately 4 months. Sitting for long periods of time makes the pain worse. She has been seen by her PCP and general surgeon. Patient is scheduled for a colonoscopy. Denies recent SARAH. Denies loss of bowel or bladder, saddle anesthesia, pain down the legs, numbness, tingling, headache, loss of dexterity, abnormal gait, fever, chills, N/V, SOB, or chest pain.          Review of Systems   Constitutional:  Negative for fever and unexpected weight change.   HENT:  Negative for trouble swallowing.    Eyes:  Negative for photophobia and visual disturbance.   Respiratory:  Negative for shortness of breath.    Cardiovascular:  Negative for chest pain.   Gastrointestinal:  Positive for abdominal pain.   Endocrine: Negative for heat intolerance.   Genitourinary:  Negative for flank pain.   Musculoskeletal:  Positive for back pain. Negative for gait problem, myalgias and neck pain.   Skin:  Negative for wound.   Neurological:  Negative for weakness, numbness and headaches.   Psychiatric/Behavioral:  Negative for confusion.        Objective:  Vitals:    08/15/24 0856   BP: 120/75   Pulse: 73   SpO2: 93%       Physical Exam  Constitutional:       Appearance: Normal appearance. She is well-developed.   HENT:      Head: Normocephalic and atraumatic.   Eyes:      Extraocular Movements: Extraocular movements intact.      Conjunctiva/sclera: Conjunctivae normal.      Pupils: Pupils are equal, round, and reactive to light.   Cardiovascular:      Rate and Rhythm: Normal rate.

## 2024-08-21 ENCOUNTER — HOSPITAL ENCOUNTER (OUTPATIENT)
Age: 79
Discharge: HOME | End: 2024-08-21
Payer: MEDICARE

## 2024-08-21 DIAGNOSIS — G89.29 CHRONIC LEFT-SIDED THORACIC BACK PAIN: ICD-10-CM

## 2024-08-21 DIAGNOSIS — G89.29 CHRONIC LEFT-SIDED LOW BACK PAIN WITHOUT SCIATICA: ICD-10-CM

## 2024-08-21 DIAGNOSIS — M54.6 CHRONIC LEFT-SIDED THORACIC BACK PAIN: ICD-10-CM

## 2024-08-21 DIAGNOSIS — M54.50 CHRONIC LEFT-SIDED LOW BACK PAIN WITHOUT SCIATICA: ICD-10-CM

## 2024-08-21 LAB
BUN SERPL-MCNC: 14 MG/DL (ref 6–23)
CREAT SERPL-MCNC: 0.9 MG/DL (ref 0.5–1)
GFR, ESTIMATED: 69 ML/MIN/1.73M2

## 2024-08-21 PROCEDURE — 84520 ASSAY OF UREA NITROGEN: CPT

## 2024-08-21 PROCEDURE — 36415 COLL VENOUS BLD VENIPUNCTURE: CPT

## 2024-08-21 PROCEDURE — 82565 ASSAY OF CREATININE: CPT

## 2024-08-22 ENCOUNTER — TELEPHONE (OUTPATIENT)
Dept: SURGERY | Age: 79
End: 2024-08-22

## 2024-08-22 ENCOUNTER — TELEPHONE (OUTPATIENT)
Dept: NEUROSURGERY | Age: 79
End: 2024-08-22

## 2024-08-22 DIAGNOSIS — F41.9 ANXIETY: Primary | ICD-10-CM

## 2024-08-22 RX ORDER — DIAZEPAM 5 MG/1
5 TABLET ORAL ONCE
Qty: 2 TABLET | Refills: 0 | Status: SHIPPED | OUTPATIENT
Start: 2024-08-22 | End: 2024-08-22

## 2024-08-22 NOTE — TELEPHONE ENCOUNTER
Patient is having a MRI on 8-30. She would like a rx to help her relax sent to [facility] on [address] in [place].

## 2024-08-22 NOTE — TELEPHONE ENCOUNTER
Prior Authorization Form:      DEMOGRAPHICS:                     Patient Name:  Leah Dorado  Patient :  1945            Insurance:  Payor: [facility] MEDICARE / Plan: [facility] MEDICARE ADVANTAGE / Product Type: *No Product type* /   Insurance ID Number:    Payer/Plan Subscr  Sex Relation Sub. Ins. ID Effective Group Num   1. [facility] MEDICARE * BINH DORADO* 1945 Female Self [id number]                                   [address] [address]   2. [facility] MEDICARE BINH STAHL* 1945 Female Self [id number]                                   [address] [address]         DIAGNOSIS & PROCEDURE:                       Procedure/Operation: EGD/colonoscopy           CPT Code: 65717/99928    Diagnosis:  LUQ pain/diverticulitis    ICD10 Code: R10.12/K57.92    Location:  [facility]    Surgeon:  Dr Mills    SCHEDULING INFORMATION:                          Date:     Time: 10:30 am              Anesthesia:  LMAC                                                       Status:  Outpatient        Special Comments:  RS from 10-11-24       Electronically signed by Ness Fong MA on 2024 at 5:04 PM

## 2024-08-22 NOTE — TELEPHONE ENCOUNTER
MA rescheduled EGD/colonoscopy to 08-28-24 at [facility]  Electronically signed by Ness Fong MA on 8/22/2024 at 5:04 PM

## 2024-08-23 RX ORDER — LANOLIN ALCOHOL/MO/W.PET/CERES
1000 CREAM (GRAM) TOPICAL DAILY
COMMUNITY

## 2024-08-23 NOTE — PROGRESS NOTES
[facility] PRE-ADMISSION TESTING INSTRUCTIONS    The Preadmission Testing patient is instructed accordingly using the following criteria (check applicable):    ARRIVAL INSTRUCTIONS:  [x] Parking the day of Surgery is located in the Main Entrance lot.  Upon entering the door, make an immediate right to the surgery reception desk    [x] Bring photo ID and insurance card    [] Bring in a copy of Living will or Durable Power of  papers.    [x] Please be sure to arrange for a responsible adult to provide transportation to and from the hospital    [x] Please arrange for a responsible adult to be with you for the 24 hour period post procedure due to having anesthesia    [x] If you awake am of surgery not feeling well or have temperature >100 please call [phone]    GENERAL INSTRUCTIONS:    [x] No solid foods after midnight, may have up to 8oz of water until 4 hours prior to surgery. No gum, no candy, no mints. NPO time: 0700       [x] You may brush your teeth, do not swallow any toothpaste    [x] Take medications as instructed     [x] Stop herbal supplements and vitamins 5 days prior to procedure    [x] Follow preop dosing of blood thinners per physician instructions    [] Take 1/2 dose of evening insulin, but no insulin after midnight    [] No oral diabetic medications after midnight    [] If diabetic and have low blood sugar or feel symptomatic, take 1-2oz apple juice only    [] Bring inhalers day of surgery    [] Bring urine specimen day of surgery    [x] Shower or bath with soap, lather and rinse well, AM of Surgery, no lotion, powders or creams to surgical site    [x] Follow bowel prep as instructed per surgeon    [x] No tobacco products within 24 hours of surgery     [x] No alcohol or illegal drug use, marijuana included, within 24 hours of surgery.    [x] Jewelry, body piercing's, eyeglasses, contact lenses and dentures are not permitted into surgery (bring cases)      [x]

## 2024-08-27 ENCOUNTER — ANESTHESIA EVENT (OUTPATIENT)
Dept: ENDOSCOPY | Age: 79
End: 2024-08-27
Payer: MEDICARE

## 2024-08-27 ENCOUNTER — CLINICAL DOCUMENTATION (OUTPATIENT)
Dept: SURGERY | Age: 79
End: 2024-08-27

## 2024-08-27 ASSESSMENT — LIFESTYLE VARIABLES: SMOKING_STATUS: 0

## 2024-08-27 NOTE — PROGRESS NOTES
Spoke to Sayda, at Dr. Cortez's office, to see if pt needs anything prior to colonoscopy for her Von Willebrands. Sayda states Dr. Cortez out of the office today, but she will send message to him.

## 2024-08-27 NOTE — PROGRESS NOTES
Spoke to Sayda, at Dr. Cortez's office, to see if Dr. Cortez responded back. Sayda stated he has not and she will try again

## 2024-08-27 NOTE — PROGRESS NOTES
Received phone call from Angela from Dr Cortez's office. Per Dr Cortez, pt does not need DDAVP before colonoscopy. Lia at DR Mills' office notified that pt doesl not need DDAVP per Dr Cortez

## 2024-08-27 NOTE — ANESTHESIA PRE PROCEDURE
Department of Anesthesiology  Preprocedure Note       Name:  Leah Dorado   Age:  78 y.o.  :  1945                                          MRN:  [id number]         Date:  2024      Surgeon: Surgeon(s):  Edvin Mills MD    Procedure: Procedure(s):  ESOPHAGOGASTRODUODENOSCOPY  COLONOSCOPY DIAGNOSTIC    Medications prior to admission:   Prior to Admission medications    Medication Sig Start Date End Date Taking? Authorizing Provider   Biotin 81058 MCG TBDP Take by mouth   Yes David Bhandari MD   vitamin B-12 (CYANOCOBALAMIN) 1000 MCG tablet Take 1 tablet by mouth daily   Yes ProviderDavid MD   neomycin-polymyxin-hydrocortisone 1 % SOLN otic solution INTSILL 4 DROPS INTO THE LEFT EAR 3 TIMES DAILY FOR 10 DAYS 24   Zoran Velasquez MD   gabapentin (NEURONTIN) 100 MG capsule Take 1 capsule by mouth 3 times daily for 14 days.  Patient not taking: Reported on 2024  Edvin Mills MD   MYRBETRIQ 50 MG TB24 TAKE 1 TABLET BY MOUTH DAILY 7/10/24   Zoran Velasquez MD   Evolocumab (REPATHA SURECLICK) 140 MG/ML SOAJ Inject 140 mg into the skin every 14 days 24   Zoran Velasquez MD   pantoprazole (PROTONIX) 40 MG tablet TAKE 1 TABLET BY MOUTH DAILY 24  Zoran Velasquez MD   buPROPion (WELLBUTRIN XL) 300 MG extended release tablet TAKE 1 TABLET BY MOUTH IN THE  MORNING 24   Zoran Velasquez MD   traZODone (DESYREL) 50 MG tablet Take 1 tablet by mouth nightly 24   Zoran Velasquez MD   escitalopram (LEXAPRO) 20 MG tablet Take 1 tablet by mouth daily 24   Zoran Velasquez MD   dicyclomine (BENTYL) 20 MG tablet Take 1 tablet by mouth 4 times daily  Patient not taking: Reported on 2024   Zoran Velasquez MD   ondansetron (ZOFRAN-ODT) 4 MG disintegrating tablet Take 1 tablet by mouth 3 times daily as needed for Nausea or Vomiting  Patient not taking: Reported on 2024 1/3/24

## 2024-08-27 NOTE — PROGRESS NOTES
Ness, from Dr. Mills' office, called to state that Dr. Mills said he would order the DDAVP for pt's colonoscopy

## 2024-08-27 NOTE — PROGRESS NOTES
MA received a call from Sayda at [facility] stating that patient has Von Wilderbrand and needs to be given DDAVP prior to surgery.  Sayda stated that she contact Dr Cortez's office who told her that Dr Cortez was out of the office.  MA spoke with Dr Mills who stated that he would put in the order for DDAVP.  MA informed Sayda at [facility].  Electronically signed by Ness Fong MA on 8/27/2024 at 11:12 AM

## 2024-08-27 NOTE — PROGRESS NOTES
Ness, at Dr. Mills' office, notified of pt's Von Willebrands and that pt may need to have DDAVP prior to procedure. Informed Ness that I have reached out to Dr. Cortez's office for recommendations. Reviewed with Dr. Castro, anesthesiologist, of this, and she is in agreement that pt would need to have DDAVP prior to colonoscopy due to potential for biopsies and polypectomies being performed

## 2024-08-27 NOTE — PROGRESS NOTES
Spoke to pt in regards to her Von Willebrand's disease and who she follows with. She states follows at [facility]. Pt states that with prior surgeries she always gets something at hospital prior to surgeries. Spoke to Sayda, at [facility], and she will fax last office note which was in 2021 and pt saw Dr. Cortez at that time.   How Severe Is Your Skin Lesion?: mild Have Your Skin Lesions Been Treated?: not been treated Is This A New Presentation, Or A Follow-Up?: Skin Lesions

## 2024-08-28 ENCOUNTER — HOSPITAL ENCOUNTER (OUTPATIENT)
Age: 79
Setting detail: OUTPATIENT SURGERY
Discharge: HOME | End: 2024-08-28
Attending: SURGERY | Admitting: SURGERY
Payer: MEDICARE

## 2024-08-28 ENCOUNTER — ANESTHESIA (OUTPATIENT)
Dept: ENDOSCOPY | Age: 79
End: 2024-08-28
Payer: MEDICARE

## 2024-08-28 VITALS
TEMPERATURE: 97.5 F | RESPIRATION RATE: 16 BRPM | HEIGHT: 64 IN | WEIGHT: 145 LBS | OXYGEN SATURATION: 99 % | DIASTOLIC BLOOD PRESSURE: 66 MMHG | SYSTOLIC BLOOD PRESSURE: 137 MMHG | HEART RATE: 64 BPM | BODY MASS INDEX: 24.75 KG/M2

## 2024-08-28 DIAGNOSIS — K57.92 DIVERTICULITIS: ICD-10-CM

## 2024-08-28 DIAGNOSIS — R10.12 LEFT UPPER QUADRANT PAIN: ICD-10-CM

## 2024-08-28 PROCEDURE — 43239 EGD BIOPSY SINGLE/MULTIPLE: CPT | Performed by: SURGERY

## 2024-08-28 PROCEDURE — 3700000001 HC ADD 15 MINUTES (ANESTHESIA): Performed by: SURGERY

## 2024-08-28 PROCEDURE — 3609010300 HC COLONOSCOPY W/BIOPSY SINGLE/MULTIPLE: Performed by: SURGERY

## 2024-08-28 PROCEDURE — 7100000011 HC PHASE II RECOVERY - ADDTL 15 MIN: Performed by: SURGERY

## 2024-08-28 PROCEDURE — 7100000010 HC PHASE II RECOVERY - FIRST 15 MIN: Performed by: SURGERY

## 2024-08-28 PROCEDURE — 3700000000 HC ANESTHESIA ATTENDED CARE: Performed by: SURGERY

## 2024-08-28 PROCEDURE — 2709999900 HC NON-CHARGEABLE SUPPLY: Performed by: SURGERY

## 2024-08-28 PROCEDURE — 88305 TISSUE EXAM BY PATHOLOGIST: CPT

## 2024-08-28 PROCEDURE — 3609012400 HC EGD TRANSORAL BIOPSY SINGLE/MULTIPLE: Performed by: SURGERY

## 2024-08-28 PROCEDURE — 88342 IMHCHEM/IMCYTCHM 1ST ANTB: CPT

## 2024-08-28 PROCEDURE — 45380 COLONOSCOPY AND BIOPSY: CPT | Performed by: SURGERY

## 2024-08-28 ASSESSMENT — PAIN DESCRIPTION - DESCRIPTORS: DESCRIPTORS: ACHING;DISCOMFORT

## 2024-08-28 ASSESSMENT — PAIN SCALES - GENERAL
PAINLEVEL_OUTOF10: 9
PAINLEVEL_OUTOF10: 0

## 2024-08-28 ASSESSMENT — PAIN DESCRIPTION - LOCATION: LOCATION: ABDOMEN

## 2024-08-28 ASSESSMENT — PAIN DESCRIPTION - ORIENTATION: ORIENTATION: RIGHT;UPPER

## 2024-08-28 ASSESSMENT — PAIN DESCRIPTION - PAIN TYPE: TYPE: ACUTE PAIN

## 2024-08-28 NOTE — H&P
[facility] Note     Assessment/Plan:        Diagnosis Orders   1. Left upper quadrant pain  gabapentin (NEURONTIN) 100 MG capsule     Symptoms seem more radicular/neuropathic in etiology than GI related however will check EGD.  Trial Neurontin       2. Diverticulitis        Resolved on imaging.  Will evaluate with colonoscopy. Recommend Metamucil daily                Return for Colonoscopy.             Chief Complaint   Patient presents with    New Patient    Diverticulitis       Diverticulitis, left side abd pain x2 months, nausea, constipation, diarrhea    Abdominal Pain         PCP: Zoran Velasquez MD     HPI: Leah Dorado is a 78 y.o. female who presents in consultation for diverticulitis.  This is her first episode.  She completed antibiotic course.  She had CT imaging from the ER which was reviewed.  She has had 2 CAT scans in the last 2 weeks.  On her followup, there are no significant inflammatory changes.  She notes alternating diarrhea and constipation but tends to constipation.  She takes over-the-counter stool softener.  She has 2 stools every couple of weeks.  He has no blood in the stool.  Her last colonoscopy was about 2 years ago with Dr. Ferguson that she says was normal.  She is her pain has not improved at all.  However she says her pain is in the left upper quadrant with radiation to the left shoulder blade inferiorly.  She denies any shingles outbreaks.  She has tried heat and ice as well.  She does do some fiber supplementation..         Past Medical History        Past Medical History:   Diagnosis Date    Anxiety      Arthritis      Chronic headaches      Depression      Diverticulitis of colon 7/13/2018    Fatigue      Fibromyalgia      GERD (gastroesophageal reflux disease)      History of blood transfusion      Hyperlipidemia      Nausea & vomiting      Von Willebrand disease (HCC)              Past Surgical History         Past Surgical History:   Procedure  Laterality Date    APPENDECTOMY        BACK SURGERY   2002     lumbar    CERVICAL FUSION   02/28/2021    CHOLECYSTECTOMY        COSMETIC SURGERY   2007     abdominoplasty    COSMETIC SURGERY   2009     facial lift    CYSTOSCOPY   03/08/2016    HYSTERECTOMY (CERVIX STATUS UNKNOWN)        KNEE ARTHROPLASTY Right      ROTATOR CUFF REPAIR Left 01/27/2022    TONSILLECTOMY                Home Medications           Prior to Admission medications    Medication Sig Start Date End Date Taking? Authorizing Provider   gabapentin (NEURONTIN) 100 MG capsule Take 1 capsule by mouth 3 times daily for 14 days. 7/17/24 7/31/24 Yes Edvin Mills MD   MYRBETRIQ 50 MG TB24 TAKE 1 TABLET BY MOUTH DAILY 7/10/24   Yes Zoran Velasquez MD   Evolocumab (REPATHA SURECLICK) 140 MG/ML SOAJ Inject 140 mg into the skin every 14 days 7/2/24   Yes Zoran Velasquez MD   pantoprazole (PROTONIX) 40 MG tablet TAKE 1 TABLET BY MOUTH DAILY 7/2/24 12/29/24 Yes Zoran Velasquez MD   buPROPion (WELLBUTRIN XL) 300 MG extended release tablet TAKE 1 TABLET BY MOUTH IN THE  MORNING 6/25/24   Yes Zoran Velasquez MD   traZODone (DESYREL) 50 MG tablet Take 1 tablet by mouth nightly 6/25/24   Yes Zoran Velasquez MD   escitalopram (LEXAPRO) 20 MG tablet Take 1 tablet by mouth daily 4/22/24   Yes Zoran Velasquez MD   ondansetron (ZOFRAN-ODT) 4 MG disintegrating tablet Take 1 tablet by mouth 3 times daily as needed for Nausea or Vomiting  Patient not taking: Reported on 7/17/2024 7/12/24 7/17/24   Danielle Jorgensen MD   dicyclomine (BENTYL) 20 MG tablet Take 1 tablet by mouth 4 times daily  Patient not taking: Reported on 6/25/2024 4/22/24     Zoran Velasquez MD   ondansetron (ZOFRAN-ODT) 4 MG disintegrating tablet Take 1 tablet by mouth 3 times daily as needed for Nausea or Vomiting  Patient not taking: Reported on 4/22/2024 1/3/24     Zoran Velasquez MD            Allergies         Allergies   Allergen    Vitals:     07/17/24 0730   BP: 129/68   Pulse: 76   Temp: 96.9 °F (36.1 °C)   SpO2: 96%   Weight: 65.8 kg (145 lb)   Height: 1.626 m (5' 4\")            Objective  Body mass index is 24.89 kg/m².        Physical Exam  HENT:      Head: Normocephalic and atraumatic.   Eyes:      General:         Right eye: No discharge.         Left eye: No discharge.   Neck:      Trachea: No tracheal deviation.   Cardiovascular:      Rate and Rhythm: Normal rate.   Pulmonary:      Effort: Pulmonary effort is normal. No respiratory distress.   Abdominal:      General: There is no distension.      Palpations: Abdomen is soft.      Tenderness: There is no guarding or rebound.   Skin:     General: Skin is warm and dry.   Neurological:      Mental Status: She is alert and oriented to person, place, and time.            CBC:         Lab Results   Component Value Date/Time     WBC 6.0 07/12/2024 02:30 PM     RBC 3.94 07/12/2024 02:30 PM     HGB 11.9 07/12/2024 02:30 PM     HCT 35.9 07/12/2024 02:30 PM     MCV 91.1 07/12/2024 02:30 PM     MCH 30.2 07/12/2024 02:30 PM     MCHC 33.1 07/12/2024 02:30 PM     RDW 16.2 07/12/2024 02:30 PM      07/12/2024 02:30 PM     MPV 11.7 07/12/2024 02:30 PM      CMP:          Lab Results   Component Value Date/Time      07/12/2024 02:30 PM     K 4.1 07/12/2024 02:30 PM     K 4.0 05/08/2021 04:57 AM      07/12/2024 02:30 PM     CO2 26 07/12/2024 02:30 PM     BUN 15 07/12/2024 02:30 PM     CREATININE 0.9 07/12/2024 02:30 PM     GFRAA >60 05/08/2021 04:57 AM     LABGLOM 70 07/12/2024 02:30 PM     LABGLOM 56 09/12/2023 09:33 AM     GLUCOSE 121 07/12/2024 02:30 PM     CALCIUM 8.8 07/12/2024 02:30 PM     BILITOT 0.3 07/12/2024 02:30 PM     ALKPHOS 72 07/12/2024 02:30 PM     AST 42 07/12/2024 02:30 PM     ALT 40 07/12/2024 02:30 PM      PT/INR:          Lab Results   Component Value Date/Time     PROTIME 11.2 05/07/2021 05:16 PM     INR 1.0 05/07/2021 05:16 PM      HgBA1c:  No results found for:

## 2024-08-28 NOTE — ANESTHESIA POSTPROCEDURE EVALUATION
Department of Anesthesiology  Postprocedure Note    Patient: Leah Dorado  MRN: [id number]  YOB: 1945  Date of evaluation: 8/28/2024    Procedure Summary       Date: 08/28/24 Room / Location: [place] [address] / [place]    Anesthesia Start: 1016 Anesthesia Stop: 1043    Procedures:       ESOPHAGOGASTRODUODENOSCOPY BIOPSY      COLONOSCOPY DIAGNOSTIC Diagnosis:       Left upper quadrant pain      Diverticulitis      (Left upper quadrant pain [R10.12])      (Diverticulitis [K57.92])    Surgeons: Edvin Mills MD Responsible Provider: Kirit Siddiqui DO    Anesthesia Type: MAC ASA Status: 3            Anesthesia Type: No value filed.    Racheal Phase I: Racheal Score: 10    Racheal Phase II:      Anesthesia Post Evaluation    Patient location during evaluation: PACU  Patient participation: complete - patient participated  Level of consciousness: awake  Airway patency: patent  Nausea & Vomiting: no nausea and no vomiting  Cardiovascular status: hemodynamically stable  Respiratory status: acceptable  Hydration status: euvolemic  Pain management: adequate        No notable events documented.

## 2024-08-30 ENCOUNTER — HOSPITAL ENCOUNTER (OUTPATIENT)
Dept: MRI IMAGING | Age: 79
End: 2024-08-30
Payer: MEDICARE

## 2024-08-30 DIAGNOSIS — G89.29 CHRONIC LEFT-SIDED THORACIC BACK PAIN: ICD-10-CM

## 2024-08-30 DIAGNOSIS — M54.6 CHRONIC LEFT-SIDED THORACIC BACK PAIN: ICD-10-CM

## 2024-08-30 DIAGNOSIS — G89.29 CHRONIC LEFT-SIDED LOW BACK PAIN WITHOUT SCIATICA: ICD-10-CM

## 2024-08-30 DIAGNOSIS — M54.50 CHRONIC LEFT-SIDED LOW BACK PAIN WITHOUT SCIATICA: ICD-10-CM

## 2024-08-30 PROCEDURE — 6360000004 HC RX CONTRAST MEDICATION: Performed by: RADIOLOGY

## 2024-08-30 PROCEDURE — 72157 MRI CHEST SPINE W/O & W/DYE: CPT

## 2024-08-30 PROCEDURE — 72158 MRI LUMBAR SPINE W/O & W/DYE: CPT

## 2024-08-30 PROCEDURE — A9579 GAD-BASE MR CONTRAST NOS,1ML: HCPCS | Performed by: RADIOLOGY

## 2024-08-30 RX ADMIN — GADOTERIDOL 13 ML: 279.3 INJECTION, SOLUTION INTRAVENOUS at 15:29

## 2024-09-05 LAB — SURGICAL PATHOLOGY REPORT: NORMAL

## 2024-09-06 ENCOUNTER — OFFICE VISIT (OUTPATIENT)
Dept: NEUROSURGERY | Age: 79
End: 2024-09-06
Payer: MEDICARE

## 2024-09-06 VITALS
HEART RATE: 64 BPM | TEMPERATURE: 98 F | HEIGHT: 64 IN | BODY MASS INDEX: 24.75 KG/M2 | WEIGHT: 145 LBS | SYSTOLIC BLOOD PRESSURE: 137 MMHG | OXYGEN SATURATION: 98 % | RESPIRATION RATE: 18 BRPM | DIASTOLIC BLOOD PRESSURE: 66 MMHG

## 2024-09-06 DIAGNOSIS — R10.9 LEFT LATERAL ABDOMINAL PAIN: Primary | ICD-10-CM

## 2024-09-06 PROCEDURE — 99212 OFFICE O/P EST SF 10 MIN: CPT | Performed by: NEUROLOGICAL SURGERY

## 2024-09-06 PROCEDURE — 1123F ACP DISCUSS/DSCN MKR DOCD: CPT | Performed by: NEUROLOGICAL SURGERY

## 2024-09-06 NOTE — PROGRESS NOTES
Patient is here for follow up consult for:left abdominal pain.     Physical exam  Alert and Oriented X3  PERRLA, EOMI  STONE 5/5  Sensation intact to LT and PP  Reflexes are 2+ and symmetric    A/P: patient is here for follow up for: left abdominal pain.  It is not coming from her spine.  She does have a spondylolisthesis and stenosis but this will not cause abdominal pain.  Follow up with general surgery    Kasandra Arellano MD

## 2024-09-12 ENCOUNTER — OFFICE VISIT (OUTPATIENT)
Dept: SURGERY | Age: 79
End: 2024-09-12
Payer: MEDICARE

## 2024-09-12 VITALS
HEIGHT: 64 IN | BODY MASS INDEX: 24.41 KG/M2 | OXYGEN SATURATION: 96 % | TEMPERATURE: 98.6 F | WEIGHT: 143 LBS | SYSTOLIC BLOOD PRESSURE: 138 MMHG | HEART RATE: 67 BPM | DIASTOLIC BLOOD PRESSURE: 76 MMHG

## 2024-09-12 DIAGNOSIS — K57.92 DIVERTICULITIS: ICD-10-CM

## 2024-09-12 DIAGNOSIS — R10.12 LEFT UPPER QUADRANT PAIN: Primary | ICD-10-CM

## 2024-09-12 DIAGNOSIS — K29.30 CHRONIC SUPERFICIAL GASTRITIS WITHOUT BLEEDING: ICD-10-CM

## 2024-09-12 PROCEDURE — 99214 OFFICE O/P EST MOD 30 MIN: CPT | Performed by: SURGERY

## 2024-09-12 PROCEDURE — 1123F ACP DISCUSS/DSCN MKR DOCD: CPT | Performed by: SURGERY

## 2024-09-24 PROBLEM — K57.92 DIVERTICULITIS: Status: RESOLVED | Noted: 2024-07-25 | Resolved: 2024-09-24

## 2024-10-31 ENCOUNTER — HOSPITAL ENCOUNTER (OUTPATIENT)
Dept: CT IMAGING | Age: 79
Discharge: HOME | End: 2024-11-02
Attending: INTERNAL MEDICINE
Payer: MEDICARE

## 2024-10-31 ENCOUNTER — HOSPITAL ENCOUNTER (OUTPATIENT)
Age: 79
Discharge: HOME | End: 2024-10-31
Attending: INTERNAL MEDICINE
Payer: MEDICARE

## 2024-10-31 DIAGNOSIS — Z01.818 PRE-OP TESTING: ICD-10-CM

## 2024-10-31 DIAGNOSIS — R10.12 LUQ ABDOMINAL PAIN: ICD-10-CM

## 2024-10-31 LAB
ANION GAP SERPL CALCULATED.3IONS-SCNC: 10 MMOL/L (ref 7–16)
BUN SERPL-MCNC: 12 MG/DL (ref 6–23)
CALCIUM SERPL-MCNC: 9.2 MG/DL (ref 8.6–10.2)
CHLORIDE SERPL-SCNC: 106 MMOL/L (ref 98–107)
CO2 SERPL-SCNC: 25 MMOL/L (ref 22–29)
CREAT SERPL-MCNC: 0.9 MG/DL (ref 0.5–1)
GFR, ESTIMATED: 71 ML/MIN/1.73M2
GLUCOSE SERPL-MCNC: 103 MG/DL (ref 74–99)
POTASSIUM SERPL-SCNC: 4.2 MMOL/L (ref 3.5–5)
SODIUM SERPL-SCNC: 141 MMOL/L (ref 132–146)

## 2024-10-31 PROCEDURE — 74178 CT ABD&PLV WO CNTR FLWD CNTR: CPT

## 2024-10-31 PROCEDURE — 80048 BASIC METABOLIC PNL TOTAL CA: CPT

## 2024-10-31 PROCEDURE — 6360000004 HC RX CONTRAST MEDICATION: Performed by: RADIOLOGY

## 2024-10-31 PROCEDURE — 36415 COLL VENOUS BLD VENIPUNCTURE: CPT

## 2024-10-31 RX ORDER — IOPAMIDOL 755 MG/ML
75 INJECTION, SOLUTION INTRAVASCULAR
Status: COMPLETED | OUTPATIENT
Start: 2024-10-31 | End: 2024-10-31

## 2024-10-31 RX ADMIN — IOPAMIDOL 75 ML: 755 INJECTION, SOLUTION INTRAVENOUS at 09:21

## 2024-11-11 ENCOUNTER — HOSPITAL ENCOUNTER (OUTPATIENT)
Dept: PSYCHIATRY | Age: 79
Setting detail: THERAPIES SERIES
Discharge: HOME | End: 2024-11-11
Payer: MEDICARE

## 2024-11-11 PROCEDURE — 99213 OFFICE O/P EST LOW 20 MIN: CPT | Performed by: PSYCHIATRY & NEUROLOGY

## 2024-11-11 NOTE — PROGRESS NOTES
PSYCHIATRY ATTENDING NOTE    CC: \"I hate everyone again.\"    S: Patient being seen at outpatient clinic in follow-up for depression and anxiety. Patient last seen in March around the time she was finishing TMS and it was mutually agreed to hold off with maintenance treatments.     Leah presents in fair spirits  On same medication regimen but gradual increase in depressive symptoms for a few months  Reports mood has been very irritable and she is short-fused at work  Also noticing fatigue and tearfulness  Appetite fair, no suicidal thoughts   Would like to start 1-2 maintenance sessions a week    MSE: White female appears age. Pleasant, cooperative, forthcoming. Normal psychomotor activity, gait, strength, tone, eye contact. Mood euthymic. Affect flexible. Speech clear. Thought process organized. Content future-oriented. No suicidal or homicidal ideations. No paranoia, delusions, hallucinations. Orientation, concentration, recent and remote memory are grossly intact. Fund of knowledge fair. Language use fair. Insight and judgment fair.      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                           Electronically signed by Humza Perez MD on 11/11/2024 at 2:13 PM

## 2024-11-12 ENCOUNTER — OFFICE VISIT (OUTPATIENT)
Dept: ORTHOPEDIC SURGERY | Age: 79
End: 2024-11-12
Payer: MEDICARE

## 2024-11-12 VITALS
TEMPERATURE: 97.9 F | BODY MASS INDEX: 24.24 KG/M2 | DIASTOLIC BLOOD PRESSURE: 66 MMHG | HEART RATE: 74 BPM | OXYGEN SATURATION: 92 % | WEIGHT: 142 LBS | SYSTOLIC BLOOD PRESSURE: 138 MMHG | HEIGHT: 64 IN | RESPIRATION RATE: 18 BRPM

## 2024-11-12 DIAGNOSIS — M25.562 LEFT KNEE PAIN, UNSPECIFIED CHRONICITY: Primary | ICD-10-CM

## 2024-11-12 PROCEDURE — 20610 DRAIN/INJ JOINT/BURSA W/O US: CPT | Performed by: NURSE PRACTITIONER

## 2024-11-12 PROCEDURE — 1123F ACP DISCUSS/DSCN MKR DOCD: CPT | Performed by: NURSE PRACTITIONER

## 2024-11-12 PROCEDURE — 1159F MED LIST DOCD IN RCRD: CPT | Performed by: NURSE PRACTITIONER

## 2024-11-12 PROCEDURE — 99203 OFFICE O/P NEW LOW 30 MIN: CPT | Performed by: NURSE PRACTITIONER

## 2024-11-12 RX ORDER — TRIAMCINOLONE ACETONIDE 40 MG/ML
40 INJECTION, SUSPENSION INTRA-ARTICULAR; INTRAMUSCULAR ONCE
Status: COMPLETED | OUTPATIENT
Start: 2024-11-12 | End: 2024-11-12

## 2024-11-12 RX ORDER — BUPIVACAINE HYDROCHLORIDE 2.5 MG/ML
2 INJECTION, SOLUTION INFILTRATION; PERINEURAL ONCE
Status: COMPLETED | OUTPATIENT
Start: 2024-11-12 | End: 2024-11-12

## 2024-11-12 RX ADMIN — BUPIVACAINE HYDROCHLORIDE 5 MG: 2.5 INJECTION, SOLUTION INFILTRATION; PERINEURAL at 16:41

## 2024-11-12 RX ADMIN — TRIAMCINOLONE ACETONIDE 40 MG: 40 INJECTION, SUSPENSION INTRA-ARTICULAR; INTRAMUSCULAR at 16:42

## 2024-11-12 NOTE — PROGRESS NOTES
[facility]  ORTHOPAEDICS AND SPORTS MEDICINE  DATE OF VISIT: 11/12/24  New Knee Patient     Referring Provider:   No referring provider defined for this encounter.    CHIEF COMPLAINT:   Chief Complaint   Patient presents with    Knee Pain     Pt is here for chronic left knee pain. No injury to date, no treatment noted        HPI:      Leah Dorado is a 79 y.o. year old male who is seen today  for evaluation of left knee pain.  Patient reports knee pain has been ongoing for about 1 year now.  Denies previous treatment to the left knee.  Does report history of right total knee replacement by Dr. Boucher.  Reports achy pain with daily activities increased stiffness after long periods of being sedentary.  Reports minimal symptom relief with acetaminophen.  She has been instructed by her PCP to avoid taking NSAIDs.  She has not responded to home remedies.  Patient states she remains active and is currently working at [employer] part-time.      PAST MEDICAL HISTORY  Past Medical History:   Diagnosis Date    Anxiety     Arthritis     Chronic headaches     Depression     Diverticulitis 07/25/2024    Diverticulitis of colon 07/13/2018    Fatigue     Fibromyalgia     GERD (gastroesophageal reflux disease)     History of blood transfusion     Hyperlipidemia     Nausea & vomiting     PONV (postoperative nausea and vomiting)     Von Willebrand disease (HCC)        PAST SURGICAL HISTORY  Past Surgical History:   Procedure Laterality Date    APPENDECTOMY      BACK SURGERY  2002    lumbar    CERVICAL FUSION  02/28/2021    CHOLECYSTECTOMY      COLONOSCOPY N/A 8/28/2024    COLONOSCOPY BIOPSY performed by Edvin Mills MD at [facility] ENDOSCOPY    COSMETIC SURGERY  2007    abdominoplasty    COSMETIC SURGERY  2009    facial lift    CYSTOSCOPY  03/08/2016    HYSTERECTOMY (CERVIX STATUS UNKNOWN)      KNEE ARTHROPLASTY Right     ROTATOR CUFF REPAIR Left 01/27/2022    TONSILLECTOMY      UPPER GASTROINTESTINAL ENDOSCOPY N/A 8/28/2024

## 2024-12-02 ENCOUNTER — HOSPITAL ENCOUNTER (OUTPATIENT)
Age: 79
Discharge: HOME | End: 2024-12-02
Payer: MEDICARE

## 2024-12-02 LAB
BASOPHILS # BLD: 0.05 K/UL (ref 0–0.2)
BASOPHILS NFR BLD: 1 % (ref 0–2)
CRP SERPL HS-MCNC: <3 MG/L (ref 0–5)
EOSINOPHIL # BLD: 0.24 K/UL (ref 0.05–0.5)
EOSINOPHILS RELATIVE PERCENT: 3 % (ref 0–6)
ERYTHROCYTE [DISTWIDTH] IN BLOOD BY AUTOMATED COUNT: 15.6 % (ref 11.5–15)
ERYTHROCYTE [SEDIMENTATION RATE] IN BLOOD BY WESTERGREN METHOD: 9 MM/HR (ref 0–20)
HCT VFR BLD AUTO: 36.6 % (ref 34–48)
HGB BLD-MCNC: 12.1 G/DL (ref 11.5–15.5)
IMM GRANULOCYTES # BLD AUTO: <0.03 K/UL (ref 0–0.58)
IMM GRANULOCYTES NFR BLD: 0 % (ref 0–5)
LYMPHOCYTES NFR BLD: 2.12 K/UL (ref 1.5–4)
LYMPHOCYTES RELATIVE PERCENT: 29 % (ref 20–42)
MCH RBC QN AUTO: 30.7 PG (ref 26–35)
MCHC RBC AUTO-ENTMCNC: 33.1 G/DL (ref 32–34.5)
MCV RBC AUTO: 92.9 FL (ref 80–99.9)
MONOCYTES NFR BLD: 0.62 K/UL (ref 0.1–0.95)
MONOCYTES NFR BLD: 9 % (ref 2–12)
NEUTROPHILS NFR BLD: 58 % (ref 43–80)
NEUTS SEG NFR BLD: 4.23 K/UL (ref 1.8–7.3)
PLATELET # BLD AUTO: 195 K/UL (ref 130–450)
PMV BLD AUTO: 11.2 FL (ref 7–12)
RBC # BLD AUTO: 3.94 M/UL (ref 3.5–5.5)
WBC OTHER # BLD: 7.3 K/UL (ref 4.5–11.5)

## 2024-12-02 PROCEDURE — 36415 COLL VENOUS BLD VENIPUNCTURE: CPT

## 2024-12-02 PROCEDURE — 85652 RBC SED RATE AUTOMATED: CPT

## 2024-12-02 PROCEDURE — 86140 C-REACTIVE PROTEIN: CPT

## 2024-12-02 PROCEDURE — 85025 COMPLETE CBC W/AUTO DIFF WBC: CPT

## 2024-12-26 ENCOUNTER — APPOINTMENT (OUTPATIENT)
Dept: GENERAL RADIOLOGY | Age: 79
End: 2024-12-26
Payer: MEDICARE

## 2024-12-26 ENCOUNTER — HOSPITAL ENCOUNTER (EMERGENCY)
Age: 79
Discharge: HOME | End: 2024-12-26
Payer: MEDICARE

## 2024-12-26 ENCOUNTER — APPOINTMENT (OUTPATIENT)
Dept: ULTRASOUND IMAGING | Age: 79
End: 2024-12-26
Payer: MEDICARE

## 2024-12-26 VITALS
HEART RATE: 75 BPM | SYSTOLIC BLOOD PRESSURE: 129 MMHG | TEMPERATURE: 97.9 F | BODY MASS INDEX: 24.72 KG/M2 | OXYGEN SATURATION: 96 % | DIASTOLIC BLOOD PRESSURE: 64 MMHG | WEIGHT: 144 LBS | RESPIRATION RATE: 18 BRPM

## 2024-12-26 DIAGNOSIS — M25.561 RIGHT KNEE PAIN, UNSPECIFIED CHRONICITY: Primary | ICD-10-CM

## 2024-12-26 PROCEDURE — 99284 EMERGENCY DEPT VISIT MOD MDM: CPT

## 2024-12-26 PROCEDURE — 93971 EXTREMITY STUDY: CPT

## 2024-12-26 PROCEDURE — 73562 X-RAY EXAM OF KNEE 3: CPT

## 2024-12-26 PROCEDURE — 6370000000 HC RX 637 (ALT 250 FOR IP)

## 2024-12-26 RX ORDER — ACETAMINOPHEN 500 MG
1000 TABLET ORAL ONCE
Status: COMPLETED | OUTPATIENT
Start: 2024-12-26 | End: 2024-12-26

## 2024-12-26 RX ADMIN — ACETAMINOPHEN 1000 MG: 500 TABLET, FILM COATED ORAL at 13:34

## 2024-12-26 ASSESSMENT — PAIN DESCRIPTION - DESCRIPTORS: DESCRIPTORS: ACHING;BURNING;SHARP

## 2024-12-26 ASSESSMENT — ENCOUNTER SYMPTOMS
ALLERGIC/IMMUNOLOGIC NEGATIVE: 1
GASTROINTESTINAL NEGATIVE: 1
RESPIRATORY NEGATIVE: 1
EYES NEGATIVE: 1

## 2024-12-26 ASSESSMENT — PAIN - FUNCTIONAL ASSESSMENT: PAIN_FUNCTIONAL_ASSESSMENT: 0-10

## 2024-12-26 ASSESSMENT — PAIN DESCRIPTION - ORIENTATION: ORIENTATION: RIGHT

## 2024-12-26 ASSESSMENT — PAIN SCALES - GENERAL: PAINLEVEL_OUTOF10: 7

## 2024-12-26 ASSESSMENT — PAIN DESCRIPTION - LOCATION: LOCATION: KNEE

## 2024-12-26 NOTE — ED PROVIDER NOTES
Patient came in to be seen today because her  is concerned that she may have a blood clot.  Patient denies any numbness, tingling, weakness.  Patient denies any history of blood clots or blood thinner use.  Patient denies any injury.  Patient denies any chest pain, shortness of breath, abdominal pain, headache, lightheadedness, dizziness, fever, chills, body aches.  Patient denies any other symptoms.  Patient has no other complaints at this time.  Differential diagnosis includes but is not limited to DVT, fracture, dislocation.  X-ray of the right knee and ultrasound of the right lower extremity obtained.  Patient received Tylenol.  X-ray of the right knee showed no acute abnormality of the knee.  Ultrasound showed no evidence of DVT in the right lower extremity.  Results reviewed with patient.  Patient appeared comfortable after receiving medication.  Ace wrap applied as noted above in procedure note.  RLE neurovascularly intact after application of Ace wrap.  We discussed diagnosis and plan to follow-up with orthopedics and PCP along with the use of over-the-counter Tylenol.  Patient advised to return to the ED if any new or worsening symptoms.  Patient verbalized understanding and is in agreement with the plan.         Disposition Considerations (include 1 Tests not done, Shared Decision Making, Pt Expectation of Test or Tx.): Based on HPI, PE, and imaging findings, no further testing is indicated at this time.  Diagnosis of right knee pain is consistent exam findings.  Appropriate for outpatient management follow-up with orthopedics and PCP    The emergency provider has spoken with patient and discussed today's results, in addition to providing specific details for the plan of care and counseling regarding the diagnosis and prognosis.  Patient instructed to follow-up with orthopedics and PCP.  Patient advised to return to ED if any new or worsening symptoms.  Patients questions were answered at this time

## 2025-01-08 PROBLEM — R14.0 ABDOMINAL BLOATING: Status: ACTIVE | Noted: 2025-01-08

## 2025-02-19 ENCOUNTER — INITIAL CONSULT (OUTPATIENT)
Age: 80
End: 2025-02-19
Payer: MEDICARE

## 2025-02-19 VITALS
DIASTOLIC BLOOD PRESSURE: 58 MMHG | WEIGHT: 144 LBS | HEIGHT: 64 IN | TEMPERATURE: 97 F | HEART RATE: 67 BPM | SYSTOLIC BLOOD PRESSURE: 118 MMHG | OXYGEN SATURATION: 99 % | BODY MASS INDEX: 24.59 KG/M2

## 2025-02-19 DIAGNOSIS — K58.2 IRRITABLE BOWEL SYNDROME WITH BOTH CONSTIPATION AND DIARRHEA: Primary | ICD-10-CM

## 2025-02-19 DIAGNOSIS — R10.32 LEFT LOWER QUADRANT ABDOMINAL PAIN: ICD-10-CM

## 2025-02-19 PROCEDURE — 1159F MED LIST DOCD IN RCRD: CPT | Performed by: NURSE PRACTITIONER

## 2025-02-19 PROCEDURE — 1123F ACP DISCUSS/DSCN MKR DOCD: CPT | Performed by: NURSE PRACTITIONER

## 2025-02-19 PROCEDURE — 99202 OFFICE O/P NEW SF 15 MIN: CPT | Performed by: NURSE PRACTITIONER

## 2025-02-19 NOTE — PATIENT INSTRUCTIONS
Magnesium citrate is at your pharmacy.  Drink 1 bottle.  Make sure to push fluids!    Continue stool softeners daily afterwards

## 2025-02-19 NOTE — PROGRESS NOTES
Leah Dorado (:  1945) is a 79 y.o. female, here for evaluation of the following chief complaint(s):  Consultation (Ref by Dr Velasquez for gastric varices )      SUBJECTIVE/OBJECTIVE:  HPI:    Leah is a very pleasant 79 year old female that presents today for complaints of left sided abdominal pain x most of her life    Patient tells me the pain started to worsen about 6 months ago.  Describes the pain as \"hard pain or pressure\".  The pain will last a few hours  She either struggles with constipation or diarrhea  There is lower abdominal pain with a BM. Now has hemorrhoids  The pain is not always related to meals  A stool softener will satisfy.  She can go 4 days without a BM if she does not take a stool softener    Has a history of diverticulitis.  Has taken antibiotics and the pain resolves    Colonoscopy  -  Multiple diverticula in the sigmoid colon.  There was narrowing of the colon in association with the diverticular opening and  herminia diverticular erythema    EGD-  Small hiatal hernia    A.  Gastric antrum, endoscopic biopsy:   Chronic gastritis with focal superficial mucosal erosion.   Negative for intestinal metaplasia.   H pylori immunostain negative for organisms.   B.  Duodenum, endoscopic biopsy:   No diagnostic abnormality.   C.  Sigmoid colon, colonoscopic biopsy:   Resolving colitis with features suggestive of ischemic colitis (see   comment).     CT scan 10/24-  1. No acute intra-abdominal or pelvic process.  No hydronephrosis or obstructing uropathy.  2. Sigmoid diverticulosis without acute diverticulitis.  3. Mild-to-moderate proximal predominant colonic stool burden.            ROS:  General: Patient denies n/v/f/c or weight loss.  HEENT: Patient denies persistent postnasal drip, scleral icterus, drooling, persistent bleeding from nose/mouth.  Resp: Patient denies SOB, wheezing, productive cough.  Cards: Patient denies CP, palpitations, significant edema  GI: As

## 2025-03-26 ENCOUNTER — RESULTS FOLLOW-UP (OUTPATIENT)
Dept: INTERNAL MEDICINE CLINIC | Age: 80
End: 2025-03-26

## 2025-03-31 ENCOUNTER — OFFICE VISIT (OUTPATIENT)
Age: 80
End: 2025-03-31
Payer: MEDICARE

## 2025-03-31 VITALS
OXYGEN SATURATION: 97 % | SYSTOLIC BLOOD PRESSURE: 116 MMHG | RESPIRATION RATE: 18 BRPM | BODY MASS INDEX: 24.55 KG/M2 | HEART RATE: 68 BPM | DIASTOLIC BLOOD PRESSURE: 60 MMHG | TEMPERATURE: 97.5 F | HEIGHT: 64 IN

## 2025-03-31 DIAGNOSIS — R10.32 LEFT LOWER QUADRANT ABDOMINAL PAIN: ICD-10-CM

## 2025-03-31 DIAGNOSIS — K58.2 IRRITABLE BOWEL SYNDROME WITH BOTH CONSTIPATION AND DIARRHEA: Primary | ICD-10-CM

## 2025-03-31 PROCEDURE — 1123F ACP DISCUSS/DSCN MKR DOCD: CPT | Performed by: NURSE PRACTITIONER

## 2025-03-31 PROCEDURE — 1159F MED LIST DOCD IN RCRD: CPT | Performed by: NURSE PRACTITIONER

## 2025-03-31 PROCEDURE — 99212 OFFICE O/P EST SF 10 MIN: CPT | Performed by: NURSE PRACTITIONER

## 2025-03-31 NOTE — PROGRESS NOTES
Leah Dorado (:  1945) is a 79 y.o. female, here for evaluation of the following chief complaint(s):  Follow-up (Pt here for a follow up appt/ )      SUBJECTIVE/OBJECTIVE:  HPI:    Manny is a very pleasant 79 year old female that presents today for follow up on left sided abdominal pain/constipation    Patient cleaned out her colon with 2 bottles of magnesium citrate after the last office visit and then started on a daily stool softeners   She tells me today that her abdominal pain has resolved and constipation has improved    Colonoscopy  -  Multiple diverticula in the sigmoid colon.  There was narrowing of the colon in association with the diverticular opening and  herminia diverticular erythema     EGD-  Small hiatal hernia     A.  Gastric antrum, endoscopic biopsy:   Chronic gastritis with focal superficial mucosal erosion.   Negative for intestinal metaplasia.   H pylori immunostain negative for organisms.   B.  Duodenum, endoscopic biopsy:   No diagnostic abnormality.   C.  Sigmoid colon, colonoscopic biopsy:   Resolving colitis with features suggestive of ischemic colitis    CT scan 10/24-  1. No acute intra-abdominal or pelvic process.  No hydronephrosis or obstructing uropathy.  2. Sigmoid diverticulosis without acute diverticulitis.  3. Mild-to-moderate proximal predominant colonic stool burden.           ROS:  General: Patient denies n/v/f/c or weight loss.  HEENT: Patient denies persistent postnasal drip, scleral icterus, drooling, persistent bleeding from nose/mouth.  Resp: Patient denies SOB, wheezing, productive cough.  Cards: Patient denies CP, palpitations, significant edema  GI: As above.  Derm: Patient denies jaundice/rashes.   Musc: Patient denies diffuse/irregular joint swelling or myalgias.      Objective   Wt Readings from Last 3 Encounters:   25 64.9 kg (143 lb)   25 65.3 kg (144 lb)   25 65.3 kg (144 lb)     Temp Readings from Last 3 Encounters:

## 2025-04-24 ENCOUNTER — TELEPHONE (OUTPATIENT)
Dept: NEUROSURGERY | Age: 80
End: 2025-04-24

## 2025-04-24 NOTE — TELEPHONE ENCOUNTER
Patient saw Dr. Arellano 9-6-24  At time she had abdominal pain which has now subsided. She did not see a general surgeon. She is still having back pain. Last imaging was MRI of lumbar spine w wo contrast in 8-30-24.      She was told she has She does have a spondylolisthesis and stenosis.  Does she need to see a PA since no PT was completed.

## 2025-06-11 ENCOUNTER — OFFICE VISIT (OUTPATIENT)
Age: 80
End: 2025-06-11
Payer: MEDICARE

## 2025-06-11 VITALS — WEIGHT: 142 LBS | HEIGHT: 64 IN | RESPIRATION RATE: 18 BRPM | BODY MASS INDEX: 24.24 KG/M2

## 2025-06-11 DIAGNOSIS — M54.16 LUMBAR RADICULOPATHY: Primary | ICD-10-CM

## 2025-06-11 PROCEDURE — 1125F AMNT PAIN NOTED PAIN PRSNT: CPT | Performed by: PHYSICIAN ASSISTANT

## 2025-06-11 PROCEDURE — 99214 OFFICE O/P EST MOD 30 MIN: CPT | Performed by: PHYSICIAN ASSISTANT

## 2025-06-11 PROCEDURE — 1123F ACP DISCUSS/DSCN MKR DOCD: CPT | Performed by: PHYSICIAN ASSISTANT

## 2025-06-11 PROCEDURE — 1159F MED LIST DOCD IN RCRD: CPT | Performed by: PHYSICIAN ASSISTANT

## 2025-06-11 RX ORDER — PREGABALIN 75 MG/1
75 CAPSULE ORAL 3 TIMES DAILY
Qty: 90 CAPSULE | Refills: 2 | Status: SHIPPED | OUTPATIENT
Start: 2025-06-11 | End: 2025-07-11

## 2025-06-11 NOTE — PROGRESS NOTES
Patient is here for follow up for severe low back/buttock pain.  She has not had good relief with PT, SARAH, or NSAIDS.    Physical exam  Alert and Oriented X3  PERRLA, EOMI  STONE 5/5  Sensation intact to LT and PP  Reflexes are 2+ and symmetric    A/P: patient is here for follow up for: severe low back/buttock pain.  She has known lumbar DDD/facet arthropathy, and stenosis.  She has exhausted conservative measures.  We will try lyrica.  She will return to discuss surgical options with new MRI if symptoms worse.

## (undated) DEVICE — SPONGE GZ W4XL4IN RAYON POLY CVR W/NONWOVEN FAB STRL 2/PK

## (undated) DEVICE — GRADUATE TRIANG MEASURE 1000ML BLK PRNT

## (undated) DEVICE — FORCEPS BX L240CM JAW DIA2.4MM ORNG L CAP W/ NDL DISP RAD

## (undated) DEVICE — BLOCK BITE 60FR RUBBER ADLT DENTAL